# Patient Record
Sex: MALE | Race: WHITE | Employment: OTHER
[De-identification: names, ages, dates, MRNs, and addresses within clinical notes are randomized per-mention and may not be internally consistent; named-entity substitution may affect disease eponyms.]

---

## 2017-01-17 ENCOUNTER — OFFICE VISIT (OUTPATIENT)
Dept: CARDIOLOGY | Facility: CLINIC | Age: 67
End: 2017-01-17

## 2017-01-17 VITALS
BODY MASS INDEX: 23.35 KG/M2 | RESPIRATION RATE: 16 BRPM | WEIGHT: 166.8 LBS | HEIGHT: 71 IN | HEART RATE: 48 BPM | SYSTOLIC BLOOD PRESSURE: 131 MMHG | DIASTOLIC BLOOD PRESSURE: 69 MMHG | OXYGEN SATURATION: 97 %

## 2017-01-17 DIAGNOSIS — Z01.810 PREOP CARDIOVASCULAR EXAM: ICD-10-CM

## 2017-01-17 DIAGNOSIS — I50.42 CHRONIC COMBINED SYSTOLIC AND DIASTOLIC CHF, NYHA CLASS 3 (HCC): Primary | ICD-10-CM

## 2017-01-17 DIAGNOSIS — I42.8 NON-ISCHEMIC CARDIOMYOPATHY (HCC): ICD-10-CM

## 2017-01-17 PROCEDURE — 1123F ACP DISCUSS/DSCN MKR DOCD: CPT | Performed by: FAMILY MEDICINE

## 2017-01-17 PROCEDURE — G8427 DOCREV CUR MEDS BY ELIG CLIN: HCPCS | Performed by: FAMILY MEDICINE

## 2017-01-17 PROCEDURE — 3017F COLORECTAL CA SCREEN DOC REV: CPT | Performed by: FAMILY MEDICINE

## 2017-01-17 PROCEDURE — 93000 ELECTROCARDIOGRAM COMPLETE: CPT | Performed by: FAMILY MEDICINE

## 2017-01-17 PROCEDURE — G8420 CALC BMI NORM PARAMETERS: HCPCS | Performed by: FAMILY MEDICINE

## 2017-01-17 PROCEDURE — G8484 FLU IMMUNIZE NO ADMIN: HCPCS | Performed by: FAMILY MEDICINE

## 2017-01-17 PROCEDURE — 1036F TOBACCO NON-USER: CPT | Performed by: FAMILY MEDICINE

## 2017-01-17 PROCEDURE — 4040F PNEUMOC VAC/ADMIN/RCVD: CPT | Performed by: FAMILY MEDICINE

## 2017-01-17 PROCEDURE — 99213 OFFICE O/P EST LOW 20 MIN: CPT | Performed by: FAMILY MEDICINE

## 2017-01-26 PROCEDURE — 93295 DEV INTERROG REMOTE 1/2/MLT: CPT | Performed by: FAMILY MEDICINE

## 2017-01-31 ENCOUNTER — TELEPHONE (OUTPATIENT)
Dept: CARDIOLOGY | Facility: CLINIC | Age: 67
End: 2017-01-31

## 2017-01-31 DIAGNOSIS — I42.8 NON-ISCHEMIC CARDIOMYOPATHY (HCC): ICD-10-CM

## 2017-01-31 DIAGNOSIS — Z95.810 ICD (IMPLANTABLE CARDIOVERTER-DEFIBRILLATOR) IN PLACE: Primary | ICD-10-CM

## 2017-01-31 PROCEDURE — 93296 REM INTERROG EVL PM/IDS: CPT | Performed by: FAMILY MEDICINE

## 2017-02-08 ENCOUNTER — OFFICE VISIT (OUTPATIENT)
Dept: VASCULAR SURGERY | Facility: CLINIC | Age: 67
End: 2017-02-08

## 2017-02-08 VITALS
TEMPERATURE: 95.9 F | DIASTOLIC BLOOD PRESSURE: 72 MMHG | SYSTOLIC BLOOD PRESSURE: 122 MMHG | HEART RATE: 79 BPM | RESPIRATION RATE: 16 BRPM | BODY MASS INDEX: 23.52 KG/M2 | WEIGHT: 168 LBS | HEIGHT: 71 IN

## 2017-02-08 DIAGNOSIS — Z99.2 ESRD (END STAGE RENAL DISEASE) ON DIALYSIS (HCC): Primary | ICD-10-CM

## 2017-02-08 DIAGNOSIS — N18.6 ESRD (END STAGE RENAL DISEASE) ON DIALYSIS (HCC): Primary | ICD-10-CM

## 2017-02-08 PROCEDURE — 99024 POSTOP FOLLOW-UP VISIT: CPT | Performed by: SURGERY

## 2017-03-15 ENCOUNTER — OFFICE VISIT (OUTPATIENT)
Dept: VASCULAR SURGERY | Age: 67
End: 2017-03-15

## 2017-03-15 VITALS
SYSTOLIC BLOOD PRESSURE: 130 MMHG | RESPIRATION RATE: 20 BRPM | HEART RATE: 94 BPM | HEIGHT: 71 IN | WEIGHT: 169.5 LBS | BODY MASS INDEX: 23.73 KG/M2 | TEMPERATURE: 97 F | DIASTOLIC BLOOD PRESSURE: 83 MMHG

## 2017-03-15 DIAGNOSIS — Z99.2 ESRD (END STAGE RENAL DISEASE) ON DIALYSIS (HCC): Primary | ICD-10-CM

## 2017-03-15 DIAGNOSIS — N18.6 ESRD (END STAGE RENAL DISEASE) ON DIALYSIS (HCC): Primary | ICD-10-CM

## 2017-03-15 PROCEDURE — 99024 POSTOP FOLLOW-UP VISIT: CPT | Performed by: SURGERY

## 2017-04-24 ENCOUNTER — TELEPHONE (OUTPATIENT)
Dept: CARDIOLOGY | Age: 67
End: 2017-04-24

## 2017-04-24 DIAGNOSIS — I42.8 NON-ISCHEMIC CARDIOMYOPATHY (HCC): ICD-10-CM

## 2017-04-24 DIAGNOSIS — Z95.810 ICD (IMPLANTABLE CARDIOVERTER-DEFIBRILLATOR) IN PLACE: Primary | ICD-10-CM

## 2017-05-01 ENCOUNTER — OFFICE VISIT (OUTPATIENT)
Dept: CARDIOLOGY | Age: 67
End: 2017-05-01
Payer: MEDICARE

## 2017-05-01 VITALS
HEIGHT: 71 IN | DIASTOLIC BLOOD PRESSURE: 53 MMHG | RESPIRATION RATE: 18 BRPM | HEART RATE: 71 BPM | SYSTOLIC BLOOD PRESSURE: 94 MMHG | WEIGHT: 171.6 LBS | BODY MASS INDEX: 24.02 KG/M2

## 2017-05-01 DIAGNOSIS — I50.42 CHRONIC COMBINED SYSTOLIC AND DIASTOLIC CHF, NYHA CLASS 3 (HCC): Primary | ICD-10-CM

## 2017-05-01 DIAGNOSIS — I95.1 DYSAUTONOMIA ORTHOSTATIC HYPOTENSION SYNDROME: ICD-10-CM

## 2017-05-01 DIAGNOSIS — I42.8 NON-ISCHEMIC CARDIOMYOPATHY (HCC): ICD-10-CM

## 2017-05-01 PROCEDURE — G8420 CALC BMI NORM PARAMETERS: HCPCS | Performed by: FAMILY MEDICINE

## 2017-05-01 PROCEDURE — 4040F PNEUMOC VAC/ADMIN/RCVD: CPT | Performed by: FAMILY MEDICINE

## 2017-05-01 PROCEDURE — 3017F COLORECTAL CA SCREEN DOC REV: CPT | Performed by: FAMILY MEDICINE

## 2017-05-01 PROCEDURE — G8427 DOCREV CUR MEDS BY ELIG CLIN: HCPCS | Performed by: FAMILY MEDICINE

## 2017-05-01 PROCEDURE — 99213 OFFICE O/P EST LOW 20 MIN: CPT | Performed by: FAMILY MEDICINE

## 2017-05-01 PROCEDURE — 1036F TOBACCO NON-USER: CPT | Performed by: FAMILY MEDICINE

## 2017-05-01 PROCEDURE — 1123F ACP DISCUSS/DSCN MKR DOCD: CPT | Performed by: FAMILY MEDICINE

## 2017-05-01 RX ORDER — SODIUM BICARBONATE 650 MG/1
325 TABLET ORAL 2 TIMES DAILY
Refills: 6 | COMMUNITY
Start: 2017-04-08

## 2017-05-03 ENCOUNTER — HOSPITAL ENCOUNTER (OUTPATIENT)
Dept: INTERVENTIONAL RADIOLOGY/VASCULAR | Age: 67
Discharge: HOME | End: 2017-05-03
Payer: MEDICARE

## 2017-05-03 VITALS
SYSTOLIC BLOOD PRESSURE: 101 MMHG | HEART RATE: 65 BPM | DIASTOLIC BLOOD PRESSURE: 65 MMHG | HEIGHT: 71 IN | OXYGEN SATURATION: 96 % | BODY MASS INDEX: 23.77 KG/M2 | RESPIRATION RATE: 16 BRPM | WEIGHT: 169.75 LBS | TEMPERATURE: 97.4 F

## 2017-05-03 DIAGNOSIS — L98.8 FISTULA: ICD-10-CM

## 2017-05-03 PROCEDURE — 76000 FLUOROSCOPY <1 HR PHYS/QHP: CPT | Performed by: RADIOLOGY

## 2017-05-03 PROCEDURE — 2500000003 HC RX 250 WO HCPCS: Performed by: RADIOLOGY

## 2017-05-03 RX ORDER — LIDOCAINE HYDROCHLORIDE 20 MG/ML
INJECTION, SOLUTION INFILTRATION; PERINEURAL
Status: COMPLETED | OUTPATIENT
Start: 2017-05-03 | End: 2017-05-03

## 2017-05-03 RX ADMIN — LIDOCAINE HYDROCHLORIDE 15 ML: 20 INJECTION, SOLUTION INFILTRATION; PERINEURAL at 09:07

## 2017-05-10 ENCOUNTER — OFFICE VISIT (OUTPATIENT)
Dept: VASCULAR SURGERY | Age: 67
End: 2017-05-10
Payer: MEDICARE

## 2017-05-10 VITALS
DIASTOLIC BLOOD PRESSURE: 62 MMHG | HEIGHT: 71 IN | RESPIRATION RATE: 18 BRPM | HEART RATE: 75 BPM | BODY MASS INDEX: 23.8 KG/M2 | WEIGHT: 170 LBS | TEMPERATURE: 97.1 F | SYSTOLIC BLOOD PRESSURE: 106 MMHG

## 2017-05-10 DIAGNOSIS — N18.6 ESRD (END STAGE RENAL DISEASE) ON DIALYSIS (HCC): Primary | ICD-10-CM

## 2017-05-10 DIAGNOSIS — Z99.2 ESRD (END STAGE RENAL DISEASE) ON DIALYSIS (HCC): Primary | ICD-10-CM

## 2017-05-10 PROCEDURE — 3017F COLORECTAL CA SCREEN DOC REV: CPT | Performed by: SURGERY

## 2017-05-10 PROCEDURE — G8420 CALC BMI NORM PARAMETERS: HCPCS | Performed by: SURGERY

## 2017-05-10 PROCEDURE — G8427 DOCREV CUR MEDS BY ELIG CLIN: HCPCS | Performed by: SURGERY

## 2017-05-10 PROCEDURE — 1123F ACP DISCUSS/DSCN MKR DOCD: CPT | Performed by: SURGERY

## 2017-05-10 PROCEDURE — 4040F PNEUMOC VAC/ADMIN/RCVD: CPT | Performed by: SURGERY

## 2017-05-10 PROCEDURE — 99213 OFFICE O/P EST LOW 20 MIN: CPT | Performed by: SURGERY

## 2017-05-10 PROCEDURE — 1036F TOBACCO NON-USER: CPT | Performed by: SURGERY

## 2017-05-10 ASSESSMENT — ENCOUNTER SYMPTOMS
TROUBLE SWALLOWING: 0
BACK PAIN: 0
ABDOMINAL PAIN: 0
COLOR CHANGE: 0
FACIAL SWELLING: 0
SHORTNESS OF BREATH: 0
CHEST TIGHTNESS: 0

## 2017-06-14 ENCOUNTER — OFFICE VISIT (OUTPATIENT)
Dept: VASCULAR SURGERY | Age: 67
End: 2017-06-14
Payer: MEDICARE

## 2017-06-14 VITALS
BODY MASS INDEX: 23.51 KG/M2 | HEIGHT: 71 IN | SYSTOLIC BLOOD PRESSURE: 127 MMHG | HEART RATE: 79 BPM | WEIGHT: 167.9 LBS | TEMPERATURE: 98.4 F | RESPIRATION RATE: 20 BRPM | DIASTOLIC BLOOD PRESSURE: 63 MMHG

## 2017-06-14 DIAGNOSIS — Z99.2 ESRD (END STAGE RENAL DISEASE) ON DIALYSIS (HCC): Primary | ICD-10-CM

## 2017-06-14 DIAGNOSIS — N18.6 ESRD (END STAGE RENAL DISEASE) ON DIALYSIS (HCC): Primary | ICD-10-CM

## 2017-06-14 PROCEDURE — 4040F PNEUMOC VAC/ADMIN/RCVD: CPT | Performed by: SURGERY

## 2017-06-14 PROCEDURE — 99212 OFFICE O/P EST SF 10 MIN: CPT | Performed by: SURGERY

## 2017-06-14 PROCEDURE — G8420 CALC BMI NORM PARAMETERS: HCPCS | Performed by: SURGERY

## 2017-06-14 PROCEDURE — G8427 DOCREV CUR MEDS BY ELIG CLIN: HCPCS | Performed by: SURGERY

## 2017-06-14 PROCEDURE — 3017F COLORECTAL CA SCREEN DOC REV: CPT | Performed by: SURGERY

## 2017-06-14 PROCEDURE — 1036F TOBACCO NON-USER: CPT | Performed by: SURGERY

## 2017-06-14 PROCEDURE — 1123F ACP DISCUSS/DSCN MKR DOCD: CPT | Performed by: SURGERY

## 2017-06-19 ENCOUNTER — HOSPITAL ENCOUNTER (OUTPATIENT)
Age: 67
Setting detail: OUTPATIENT SURGERY
Discharge: HOME | End: 2017-06-19
Attending: SPECIALIST | Admitting: SPECIALIST
Payer: MEDICARE

## 2017-06-19 VITALS
SYSTOLIC BLOOD PRESSURE: 125 MMHG | WEIGHT: 170 LBS | DIASTOLIC BLOOD PRESSURE: 79 MMHG | TEMPERATURE: 96.8 F | OXYGEN SATURATION: 98 % | HEART RATE: 73 BPM | RESPIRATION RATE: 18 BRPM | HEIGHT: 72 IN | BODY MASS INDEX: 23.03 KG/M2

## 2017-06-19 PROCEDURE — 88305 TISSUE EXAM BY PATHOLOGIST: CPT

## 2017-06-19 PROCEDURE — 3600000012 HC SURGERY LEVEL 2 ADDTL 15MIN: Performed by: SPECIALIST

## 2017-06-19 PROCEDURE — 2500000003 HC RX 250 WO HCPCS: Performed by: SPECIALIST

## 2017-06-19 PROCEDURE — 88332 PATH CONSLTJ SURG EA ADD BLK: CPT

## 2017-06-19 PROCEDURE — 3600000002 HC SURGERY LEVEL 2 BASE: Performed by: SPECIALIST

## 2017-06-19 PROCEDURE — 88331 PATH CONSLTJ SURG 1 BLK 1SPC: CPT

## 2017-06-19 RX ORDER — SODIUM CHLORIDE, SODIUM LACTATE, POTASSIUM CHLORIDE, CALCIUM CHLORIDE 600; 310; 30; 20 MG/100ML; MG/100ML; MG/100ML; MG/100ML
INJECTION, SOLUTION INTRAVENOUS CONTINUOUS
Status: DISCONTINUED | OUTPATIENT
Start: 2017-06-19 | End: 2017-06-19 | Stop reason: HOSPADM

## 2017-06-19 RX ORDER — LIDOCAINE HYDROCHLORIDE AND EPINEPHRINE 10; 10 MG/ML; UG/ML
INJECTION, SOLUTION INFILTRATION; PERINEURAL PRN
Status: DISCONTINUED | OUTPATIENT
Start: 2017-06-19 | End: 2017-06-19 | Stop reason: HOSPADM

## 2017-06-19 ASSESSMENT — PAIN - FUNCTIONAL ASSESSMENT: PAIN_FUNCTIONAL_ASSESSMENT: 0-10

## 2017-06-20 LAB — SURGICAL PATHOLOGY REPORT: NORMAL

## 2017-06-22 ENCOUNTER — CARE COORDINATION (OUTPATIENT)
Dept: CASE MANAGEMENT | Age: 67
End: 2017-06-22

## 2017-07-20 PROCEDURE — 93295 DEV INTERROG REMOTE 1/2/MLT: CPT | Performed by: FAMILY MEDICINE

## 2017-07-24 ENCOUNTER — TELEPHONE (OUTPATIENT)
Dept: CARDIOLOGY | Age: 67
End: 2017-07-24

## 2017-07-24 DIAGNOSIS — I42.8 NON-ISCHEMIC CARDIOMYOPATHY (HCC): Primary | ICD-10-CM

## 2017-07-24 DIAGNOSIS — Z95.810 ICD (IMPLANTABLE CARDIOVERTER-DEFIBRILLATOR) IN PLACE: ICD-10-CM

## 2017-07-24 PROCEDURE — 93296 REM INTERROG EVL PM/IDS: CPT | Performed by: FAMILY MEDICINE

## 2017-07-25 ENCOUNTER — TELEPHONE (OUTPATIENT)
Dept: CARDIOLOGY | Age: 67
End: 2017-07-25

## 2017-10-25 ENCOUNTER — OFFICE VISIT (OUTPATIENT)
Dept: CARDIOLOGY | Age: 67
End: 2017-10-25
Payer: MEDICARE

## 2017-10-25 VITALS
HEART RATE: 62 BPM | SYSTOLIC BLOOD PRESSURE: 122 MMHG | DIASTOLIC BLOOD PRESSURE: 77 MMHG | RESPIRATION RATE: 22 BRPM | BODY MASS INDEX: 23.8 KG/M2 | WEIGHT: 170 LBS | HEIGHT: 71 IN | OXYGEN SATURATION: 98 %

## 2017-10-25 DIAGNOSIS — Z95.810 ICD (IMPLANTABLE CARDIOVERTER-DEFIBRILLATOR) IN PLACE: Primary | ICD-10-CM

## 2017-10-25 DIAGNOSIS — I42.8 NON-ISCHEMIC CARDIOMYOPATHY (HCC): ICD-10-CM

## 2017-10-25 DIAGNOSIS — I42.8 NON-ISCHEMIC CARDIOMYOPATHY (HCC): Primary | ICD-10-CM

## 2017-10-25 DIAGNOSIS — I50.42 CHRONIC COMBINED SYSTOLIC AND DIASTOLIC CHF, NYHA CLASS 3 (HCC): ICD-10-CM

## 2017-10-25 DIAGNOSIS — I49.5 CHRONOTROPIC INCOMPETENCE WITH SINUS NODE DYSFUNCTION (HCC): ICD-10-CM

## 2017-10-25 DIAGNOSIS — I95.1 DYSAUTONOMIA ORTHOSTATIC HYPOTENSION SYNDROME: ICD-10-CM

## 2017-10-25 DIAGNOSIS — Z45.02 IMPLANTABLE DEFIBRILLATOR REPROGRAMMING/CHECK: ICD-10-CM

## 2017-10-25 PROCEDURE — G8427 DOCREV CUR MEDS BY ELIG CLIN: HCPCS | Performed by: FAMILY MEDICINE

## 2017-10-25 PROCEDURE — 93289 INTERROG DEVICE EVAL HEART: CPT | Performed by: FAMILY MEDICINE

## 2017-10-25 PROCEDURE — 1036F TOBACCO NON-USER: CPT | Performed by: FAMILY MEDICINE

## 2017-10-25 PROCEDURE — 99214 OFFICE O/P EST MOD 30 MIN: CPT | Performed by: FAMILY MEDICINE

## 2017-10-25 PROCEDURE — 1123F ACP DISCUSS/DSCN MKR DOCD: CPT | Performed by: FAMILY MEDICINE

## 2017-10-25 PROCEDURE — G8420 CALC BMI NORM PARAMETERS: HCPCS | Performed by: FAMILY MEDICINE

## 2017-10-25 PROCEDURE — 4040F PNEUMOC VAC/ADMIN/RCVD: CPT | Performed by: FAMILY MEDICINE

## 2017-10-25 PROCEDURE — 3017F COLORECTAL CA SCREEN DOC REV: CPT | Performed by: FAMILY MEDICINE

## 2017-10-25 PROCEDURE — 93000 ELECTROCARDIOGRAM COMPLETE: CPT | Performed by: FAMILY MEDICINE

## 2017-10-25 PROCEDURE — G8484 FLU IMMUNIZE NO ADMIN: HCPCS | Performed by: FAMILY MEDICINE

## 2017-10-25 RX ORDER — LISINOPRIL 5 MG/1
2.5 TABLET ORAL DAILY
Qty: 45 TABLET | Refills: 3 | Status: SHIPPED | OUTPATIENT
Start: 2017-10-25 | End: 2018-09-12 | Stop reason: SDUPTHER

## 2017-10-25 NOTE — PROGRESS NOTES
Patient: Agustina Garcia  : 1950  Date of Visit: 2017    REASON FOR VISIT / CONSULTATION: 1 Year Follow Up (Pacer check today. EKG done today. HX: Non-ischemic cardiomyopathy, CHF. Pt wt at last visit 171 todays wt 170. Pt states he is doing ok. Denies: CP, palpitations, lightheaded/dizziness, SOB.)      Dear Robi Davidson MD,        I had the pleasure of seeing your patient Agustina Garcia in follow up today. As you know, Mr. Toya Castro is a 74 y.o. male with a history of a non ischemic dilated cardiomyopathy leading to placement of a Biotronic ICD with a single lead with atrial sensing ability. Unfortunately, he was found to have chronotropic incompetence since that time with moderate and persistent increased shortness of breath with any exertion leading to an upgrade of his ICD to a dual-chamber ICD with placement of a new atrial lead. He also has some dysautonomia which has been fairly well controlled recently. In  he had an AV fistula placed for dialysis and is currently being treated with dialysis. Since the last time I saw him he reports doing fairly well. He denied any current or recent chest pain, shortness of breath, abdominal pain, bleeding problems, problems with his medications or any other concerns at this time. He says he thinks he might be able to walk a mile without stopping and says he uses an exercise bike at home without any significant chest pain or shortness of breath.      Past Medical History:   Diagnosis Date    CAD (coronary artery disease)     Cancer ([facility]     left ear    Chronic combined systolic and diastolic CHF, NYHA class 3 ([facility])     EF 25-30%    CRF (chronic renal failure) / stage 4 2013    Dysautonomia orthostatic hypotension syndrome ([facility] 2016    Hemodialysis patient ([facility]     Tuesday, Thursday, Saturday -- Robert    History of blood transfusion     History of echocardiogram 13    LMCA: mild irreg 10-20%, LAD: mild irreg 10-20%, LCx: mild irreg 10-20%, RCA: mild irreg 10-20% The conus branch of the RCA had separate ostis off of the aorta, prly of little if any clinical significance.  ICD (implantable cardiac defibrillator), single, in situ 12/16/13    Biotronic    Mixed hyperlipidemia     Non-ischemic cardiomyopathy ([facility] 6/10/2013    EF 25-30%     Restless leg syndrome     Systolic CHF, acute on chronic ([facility] 4/23/2013       CURRENT ALLERGIES: Ancef [cefazolin] and Marcaine [bupivacaine hcl] REVIEW OF SYSTEMS: 10 systems were reviewed. Pertinent positives and negatives as above, all else negative.      Past Surgical History:   Procedure Laterality Date    CARDIAC CATHETERIZATION  6/6/2013    Dr. Leopoldo Diaz  01/18/2017    Left Upper Arm- Dr. Dianna Paniagua, COLON, DIAGNOSTIC      EXCISION / BIOPSY SKIN LESION OF HEAD / NECK Left 6/19/2017    NECK LESION BIOPSY EXCISION, BCC, FROZEN SECTION performed by Connie Arnett MD at [address] [place] [address] Right HIP    OTHER SURGICAL HISTORY  11/2016    Right chest port for dialysis    PACEMAKER PLACEMENT  2013    AICD    PRE-MALIGNANT / BENIGN SKIN LESION EXCISION Left 06/19/2017    Neck area    SKIN BIOPSY  9-    left axila    SKIN CANCER EXCISION Left 06/05/2015    Ear    TYMPANOPLASTY Left 05/12/2016    per Dr. Mariusz Washington History:  Social History   Substance Use Topics    Smoking status: Never Smoker    Smokeless tobacco: Never Used    Alcohol use 0.0 oz/week     3 - 4 Cans of beer per week      Comment: daily drinker BEER 3-4        CURRENT MEDICATIONS:  Outpatient Prescriptions Marked as Taking for the 10/25/17 encounter (Office Visit) with Jeremi Isaac MD   Medication Sig Dispense Refill    lisinopril (PRINIVIL;ZESTRIL) 5 MG tablet Take 0.5 tablets by mouth daily 45 tablet 3    gabapentin (NEURONTIN) 100 MG capsule TAKE 1 CAPSULE BY MOUTH 2 TIMES DAILY AS NEEDED (FOOT NUMBNESS) 60 capsule 0    rOPINIRole (REQUIP) 0.5 MG tablet TAKE 1 TABLET BY MOUTH AT BEDTIME 90 tablet 1    sodium bicarbonate 650 MG tablet TAKE 1 TABLET BY MOUTH EVERY DAY  6    carbidopa-levodopa (SINEMET)  MG per tablet TAKE 1 TABLET BY MOUTH IN THE MORNING AND TAKE 2 TABLETS IN THE EVENING 90 tablet 6    metoprolol tartrate (LOPRESSOR) 25 MG tablet Take 0.5 tablets by mouth 2 times daily (Patient taking differently: Take 25 mg by mouth daily ) 90 tablet 3    allopurinol (ZYLOPRIM) 100 MG tablet Take 1 tablet by mouth daily 30 tablet 5    midodrine (PROAMATINE) 5 MG tablet Take 1 tablet by mouth 2 times daily (with meals) (Patient taking differently: Take 5 mg by mouth daily 1 tab prior to dialysis  (Tuesday Thursday and Saturday)) 90 tablet 3       FAMILY HISTORY: family history includes Heart Disease in his father. PHYSICAL EXAM:   /77 (Site: Right Arm, Position: Sitting, Cuff Size: Medium Adult)   Pulse 62   Resp 22   Ht 5' 11\" (1.803 m)   Wt 170 lb (77.1 kg)   SpO2 98%   BMI 23.71 kg/m²  Body mass index is 23.71 kg/m². Constitutional: He is oriented to person, place, and time. He appears well-developed and well-nourished. In no acute distress. HEENT: Normocephalic and atraumatic. No JVD present. Carotid bruit is not present. No mass and no thyromegaly present. No lymphadenopathy present. Cardiovascular: Normal rate, regular rhythm, normal heart sounds and intact distal pulses. Exam reveals no gallop and no friction rub. I/VI systolic murmur heard maximally at the [place] and a soft holodiastolic murmur most likely due to his AV fistula. Pulmonary/Chest: Effort normal and breath sounds normal. No respiratory distress. He has no wheezes, rhonchi or rales. Abdominal: Soft, non-tender. Bowel sounds and aorta are normal. He exhibits no organomegaly, mass or bruit. Extremities: Trace lower extremity edema, no cyanosis, or clubbing. Pulses are 2+ radial/carotid.  1+ dorsalis pedis and every 6 months. Repeat ICD check in 3 months at home. FOLLOW UP:   I told Mr. Nanda Ardon to call my office if he had any problems, but otherwise told him to Return in about 6 months (around 4/25/2018). However, I would be happy to see him sooner should the need arise. Once again, thank you for allowing me to participate in this patients care. Please do not hesitate to contact me could I be of further assistance. Sincerely,  Parag Lucero MD, MS, F.A.C.C. [facility] Cardiology Specialist  [address] [facility], [address] [place] [address]  Phone: [phone], Fax: [phone]    I believe that the risk of significant morbidity and mortality related to the patient's current medical conditions are: intermediate. 25 minutes were spent with the patient and all of his questions were answered.

## 2017-11-21 ENCOUNTER — ANESTHESIA EVENT (OUTPATIENT)
Dept: OPERATING ROOM | Age: 67
End: 2017-11-21
Payer: MEDICARE

## 2017-11-22 ENCOUNTER — ANESTHESIA (OUTPATIENT)
Dept: OPERATING ROOM | Age: 67
End: 2017-11-22
Payer: MEDICARE

## 2017-11-22 ENCOUNTER — HOSPITAL ENCOUNTER (OUTPATIENT)
Age: 67
Setting detail: OUTPATIENT SURGERY
Discharge: HOME | End: 2017-11-22
Attending: SURGERY | Admitting: SURGERY
Payer: MEDICARE

## 2017-11-22 VITALS
TEMPERATURE: 97.7 F | WEIGHT: 170 LBS | HEART RATE: 70 BPM | HEIGHT: 71 IN | SYSTOLIC BLOOD PRESSURE: 110 MMHG | DIASTOLIC BLOOD PRESSURE: 69 MMHG | BODY MASS INDEX: 23.8 KG/M2 | RESPIRATION RATE: 16 BRPM | OXYGEN SATURATION: 99 %

## 2017-11-22 VITALS — SYSTOLIC BLOOD PRESSURE: 108 MMHG | TEMPERATURE: 94.7 F | OXYGEN SATURATION: 95 % | DIASTOLIC BLOOD PRESSURE: 48 MMHG

## 2017-11-22 LAB — POTASSIUM SERPL-SCNC: 5 MMOL/L (ref 3.7–5.3)

## 2017-11-22 PROCEDURE — 3600000013 HC SURGERY LEVEL 3 ADDTL 15MIN: Performed by: SURGERY

## 2017-11-22 PROCEDURE — 84132 ASSAY OF SERUM POTASSIUM: CPT

## 2017-11-22 PROCEDURE — 3700000001 HC ADD 15 MINUTES (ANESTHESIA): Performed by: SURGERY

## 2017-11-22 PROCEDURE — 7100000011 HC PHASE II RECOVERY - ADDTL 15 MIN: Performed by: SURGERY

## 2017-11-22 PROCEDURE — 2500000003 HC RX 250 WO HCPCS: Performed by: NURSE ANESTHETIST, CERTIFIED REGISTERED

## 2017-11-22 PROCEDURE — 2500000003 HC RX 250 WO HCPCS: Performed by: SURGERY

## 2017-11-22 PROCEDURE — 7100000010 HC PHASE II RECOVERY - FIRST 15 MIN: Performed by: SURGERY

## 2017-11-22 PROCEDURE — 6360000002 HC RX W HCPCS: Performed by: NURSE ANESTHETIST, CERTIFIED REGISTERED

## 2017-11-22 PROCEDURE — 3700000000 HC ANESTHESIA ATTENDED CARE: Performed by: SURGERY

## 2017-11-22 PROCEDURE — 36415 COLL VENOUS BLD VENIPUNCTURE: CPT

## 2017-11-22 PROCEDURE — 2580000003 HC RX 258: Performed by: SURGERY

## 2017-11-22 PROCEDURE — 3600000003 HC SURGERY LEVEL 3 BASE: Performed by: SURGERY

## 2017-11-22 PROCEDURE — C1768 GRAFT, VASCULAR: HCPCS | Performed by: SURGERY

## 2017-11-22 PROCEDURE — 6360000002 HC RX W HCPCS: Performed by: SURGERY

## 2017-11-22 DEVICE — GRAFT VASC L45CM DIA4-7MM PTFE CBAS HEP SURF STD WALLED: Type: IMPLANTABLE DEVICE | Site: ARM | Status: FUNCTIONAL

## 2017-11-22 RX ORDER — PROPOFOL 10 MG/ML
INJECTION, EMULSION INTRAVENOUS PRN
Status: DISCONTINUED | OUTPATIENT
Start: 2017-11-22 | End: 2017-11-22 | Stop reason: SDUPTHER

## 2017-11-22 RX ORDER — HYDROCODONE BITARTRATE AND ACETAMINOPHEN 5; 325 MG/1; MG/1
2 TABLET ORAL EVERY 6 HOURS PRN
Qty: 20 TABLET | Refills: 0 | Status: SHIPPED | OUTPATIENT
Start: 2017-11-22 | End: 2018-08-07

## 2017-11-22 RX ORDER — LIDOCAINE HYDROCHLORIDE 10 MG/ML
INJECTION, SOLUTION EPIDURAL; INFILTRATION; INTRACAUDAL; PERINEURAL PRN
Status: DISCONTINUED | OUTPATIENT
Start: 2017-11-22 | End: 2017-11-22 | Stop reason: HOSPADM

## 2017-11-22 RX ORDER — ONDANSETRON 2 MG/ML
4 INJECTION INTRAMUSCULAR; INTRAVENOUS EVERY 8 HOURS PRN
Status: DISCONTINUED | OUTPATIENT
Start: 2017-11-22 | End: 2017-11-22 | Stop reason: HOSPADM

## 2017-11-22 RX ORDER — LABETALOL HYDROCHLORIDE 5 MG/ML
5 INJECTION, SOLUTION INTRAVENOUS EVERY 10 MIN PRN
Status: DISCONTINUED | OUTPATIENT
Start: 2017-11-22 | End: 2017-11-22 | Stop reason: HOSPADM

## 2017-11-22 RX ORDER — SODIUM CHLORIDE, SODIUM LACTATE, POTASSIUM CHLORIDE, CALCIUM CHLORIDE 600; 310; 30; 20 MG/100ML; MG/100ML; MG/100ML; MG/100ML
INJECTION, SOLUTION INTRAVENOUS CONTINUOUS
Status: DISCONTINUED | OUTPATIENT
Start: 2017-11-22 | End: 2017-11-22 | Stop reason: HOSPADM

## 2017-11-22 RX ORDER — CLINDAMYCIN PHOSPHATE 900 MG/50ML
900 INJECTION INTRAVENOUS ONCE
Status: COMPLETED | OUTPATIENT
Start: 2017-11-22 | End: 2017-11-22

## 2017-11-22 RX ORDER — FENTANYL CITRATE 50 UG/ML
50 INJECTION, SOLUTION INTRAMUSCULAR; INTRAVENOUS EVERY 5 MIN PRN
Status: DISCONTINUED | OUTPATIENT
Start: 2017-11-22 | End: 2017-11-22 | Stop reason: HOSPADM

## 2017-11-22 RX ORDER — LIDOCAINE HYDROCHLORIDE 20 MG/ML
INJECTION, SOLUTION INFILTRATION; PERINEURAL PRN
Status: DISCONTINUED | OUTPATIENT
Start: 2017-11-22 | End: 2017-11-22 | Stop reason: SDUPTHER

## 2017-11-22 RX ORDER — HYDROCODONE BITARTRATE AND ACETAMINOPHEN 5; 325 MG/1; MG/1
1 TABLET ORAL PRN
Status: DISCONTINUED | OUTPATIENT
Start: 2017-11-22 | End: 2017-11-22 | Stop reason: HOSPADM

## 2017-11-22 RX ORDER — ACETAMINOPHEN 325 MG/1
650 TABLET ORAL EVERY 4 HOURS PRN
Status: DISCONTINUED | OUTPATIENT
Start: 2017-11-22 | End: 2017-11-22 | Stop reason: HOSPADM

## 2017-11-22 RX ORDER — MIDAZOLAM HYDROCHLORIDE 1 MG/ML
INJECTION INTRAMUSCULAR; INTRAVENOUS PRN
Status: DISCONTINUED | OUTPATIENT
Start: 2017-11-22 | End: 2017-11-22 | Stop reason: SDUPTHER

## 2017-11-22 RX ORDER — FENTANYL CITRATE 50 UG/ML
INJECTION, SOLUTION INTRAMUSCULAR; INTRAVENOUS PRN
Status: DISCONTINUED | OUTPATIENT
Start: 2017-11-22 | End: 2017-11-22 | Stop reason: SDUPTHER

## 2017-11-22 RX ORDER — HYDRALAZINE HYDROCHLORIDE 20 MG/ML
5 INJECTION INTRAMUSCULAR; INTRAVENOUS EVERY 10 MIN PRN
Status: DISCONTINUED | OUTPATIENT
Start: 2017-11-22 | End: 2017-11-22 | Stop reason: HOSPADM

## 2017-11-22 RX ORDER — HYDROCODONE BITARTRATE AND ACETAMINOPHEN 5; 325 MG/1; MG/1
2 TABLET ORAL PRN
Status: DISCONTINUED | OUTPATIENT
Start: 2017-11-22 | End: 2017-11-22 | Stop reason: HOSPADM

## 2017-11-22 RX ORDER — SODIUM CHLORIDE 9 MG/ML
INJECTION, SOLUTION INTRAVENOUS CONTINUOUS
Status: DISCONTINUED | OUTPATIENT
Start: 2017-11-22 | End: 2017-11-22 | Stop reason: HOSPADM

## 2017-11-22 RX ORDER — FENTANYL CITRATE 50 UG/ML
25 INJECTION, SOLUTION INTRAMUSCULAR; INTRAVENOUS EVERY 5 MIN PRN
Status: DISCONTINUED | OUTPATIENT
Start: 2017-11-22 | End: 2017-11-22 | Stop reason: HOSPADM

## 2017-11-22 RX ORDER — ONDANSETRON 2 MG/ML
4 INJECTION INTRAMUSCULAR; INTRAVENOUS
Status: COMPLETED | OUTPATIENT
Start: 2017-11-22 | End: 2017-11-22

## 2017-11-22 RX ADMIN — FENTANYL CITRATE 25 MCG: 50 INJECTION, SOLUTION INTRAMUSCULAR; INTRAVENOUS at 13:40

## 2017-11-22 RX ADMIN — LIDOCAINE HYDROCHLORIDE 25 MG: 20 INJECTION, SOLUTION INFILTRATION; PERINEURAL at 13:56

## 2017-11-22 RX ADMIN — PROPOFOL 10 MG: 10 INJECTION, EMULSION INTRAVENOUS at 14:24

## 2017-11-22 RX ADMIN — ONDANSETRON 4 MG: 2 INJECTION INTRAMUSCULAR; INTRAVENOUS at 14:35

## 2017-11-22 RX ADMIN — PROPOFOL 10 MG: 10 INJECTION, EMULSION INTRAVENOUS at 14:35

## 2017-11-22 RX ADMIN — FENTANYL CITRATE 50 MCG: 50 INJECTION, SOLUTION INTRAMUSCULAR; INTRAVENOUS at 13:32

## 2017-11-22 RX ADMIN — PROPOFOL 10 MG: 10 INJECTION, EMULSION INTRAVENOUS at 14:44

## 2017-11-22 RX ADMIN — PROPOFOL 15 MG: 10 INJECTION, EMULSION INTRAVENOUS at 14:40

## 2017-11-22 RX ADMIN — MIDAZOLAM HYDROCHLORIDE 1 MG: 2 INJECTION, SOLUTION INTRAMUSCULAR; INTRAVENOUS at 13:33

## 2017-11-22 RX ADMIN — FENTANYL CITRATE 25 MCG: 50 INJECTION, SOLUTION INTRAMUSCULAR; INTRAVENOUS at 13:48

## 2017-11-22 RX ADMIN — PROPOFOL 10 MG: 10 INJECTION, EMULSION INTRAVENOUS at 14:31

## 2017-11-22 RX ADMIN — PROPOFOL 10 MG: 10 INJECTION, EMULSION INTRAVENOUS at 14:03

## 2017-11-22 RX ADMIN — PROPOFOL 10 MG: 10 INJECTION, EMULSION INTRAVENOUS at 14:16

## 2017-11-22 RX ADMIN — PROPOFOL 15 MG: 10 INJECTION, EMULSION INTRAVENOUS at 14:47

## 2017-11-22 RX ADMIN — PROPOFOL 10 MG: 10 INJECTION, EMULSION INTRAVENOUS at 14:09

## 2017-11-22 RX ADMIN — SODIUM CHLORIDE: 9 INJECTION, SOLUTION INTRAVENOUS at 11:51

## 2017-11-22 RX ADMIN — PROPOFOL 15 MG: 10 INJECTION, EMULSION INTRAVENOUS at 14:14

## 2017-11-22 RX ADMIN — PROPOFOL 15 MG: 10 INJECTION, EMULSION INTRAVENOUS at 13:51

## 2017-11-22 RX ADMIN — MIDAZOLAM HYDROCHLORIDE 1 MG: 2 INJECTION, SOLUTION INTRAMUSCULAR; INTRAVENOUS at 13:37

## 2017-11-22 RX ADMIN — PROPOFOL 10 MG: 10 INJECTION, EMULSION INTRAVENOUS at 14:50

## 2017-11-22 RX ADMIN — CLINDAMYCIN IN 5 PERCENT DEXTROSE 900 MG: 18 INJECTION, SOLUTION INTRAVENOUS at 13:19

## 2017-11-22 RX ADMIN — PROPOFOL 10 MG: 10 INJECTION, EMULSION INTRAVENOUS at 14:22

## 2017-11-22 RX ADMIN — PROPOFOL 15 MG: 10 INJECTION, EMULSION INTRAVENOUS at 14:28

## 2017-11-22 RX ADMIN — PROPOFOL 15 MG: 10 INJECTION, EMULSION INTRAVENOUS at 13:56

## 2017-11-22 RX ADMIN — PROPOFOL 10 MG: 10 INJECTION, EMULSION INTRAVENOUS at 13:46

## 2017-11-22 RX ADMIN — PROPOFOL 10 MG: 10 INJECTION, EMULSION INTRAVENOUS at 13:43

## 2017-11-22 ASSESSMENT — LIFESTYLE VARIABLES: SMOKING_STATUS: 0

## 2017-11-22 ASSESSMENT — PAIN SCALES - GENERAL: PAINLEVEL_OUTOF10: 0

## 2017-11-22 ASSESSMENT — PAIN - FUNCTIONAL ASSESSMENT: PAIN_FUNCTIONAL_ASSESSMENT: 0-10

## 2017-11-22 NOTE — PROGRESS NOTES
Attempted PAT phone call; no answer; unable to leave a message. No voicemail.
Pt returned PAT phone call. Patient instructed on the pre-operative, intra-operative, and post-operative process. Patient's surgery arrival time to the hospital and surgery start time confirmed for the day of surgery. Patient instructed on NPO status. Medication instructions and pre operative instruction sheet reviewed and faxed to [facility].
Unable to contact pt for PAT phone call. Pt's phone does not have voicemail set up. [facility] surgery scheduled on 11-22-17.
transportation arrangements that do not require patient to operate motor Vehicle.      Yes

## 2017-11-22 NOTE — ANESTHESIA POSTPROCEDURE EVALUATION
Department of Anesthesiology  Postprocedure Note    Patient: Pratima Mendez  MRN: [id number]  YOB: 1950  Date of evaluation: 11/22/2017  Time:  3:39 PM     Procedure Summary     Date:  11/22/17 Room / Location:  [address] [place] [address] OR  / Sarah Childs OR    Anesthesia Start:  0021 Anesthesia Stop:  3323    Procedure:  AV FISTULA CREATION REVISION-UPPER EXTREMITY (Right ) Diagnosis:  (ESRD)    Surgeon:  Jorge Lunsford DO Responsible Provider:  Julianne Douglass CRNA    Anesthesia Type:  MAC ASA Status:  4          Anesthesia Type: MAC    Courtney Phase I: Courtney Score: 10    Courtney Phase II: Courtney Score: 10    Last vitals: Reviewed and per EMR flowsheets.        Anesthesia Post Evaluation    Patient location during evaluation: PACU  Patient participation: complete - patient participated  Level of consciousness: awake and alert  Airway patency: patent  Nausea & Vomiting: no nausea and no vomiting  Complications: no  Cardiovascular status: blood pressure returned to baseline and hemodynamically stable  Respiratory status: acceptable and room air  Hydration status: euvolemic  Comments: Denies any pain

## 2017-11-22 NOTE — ANESTHESIA PRE PROCEDURE
 0.9 % sodium chloride infusion   Intravenous Continuous Clydell Bunting,  mL/hr at 11/22/17 1151         Allergies: Allergies   Allergen Reactions    Ancef [Cefazolin] Anaphylaxis    Marcaine [Bupivacaine Hcl] Anaphylaxis       Problem List:    Patient Active Problem List   Diagnosis Code    Anemia, iron deficiency D50.9    Non-ischemic cardiomyopathy ([facility] I42.8    Chronic combined systolic and diastolic CHF, NYHA class 3 (HCC) I50.42    Implantable defibrillator reprogramming/check Z45.02    Chronotropic incompetence with sinus node dysfunction ([facility]) I49.5    Frequent PVCs I49.3    Hypotension due to drugs I95.2    Mixed hyperlipidemia E78.2    Syncope R55    Dysautonomia orthostatic hypotension syndrome ([facility]) G90.3    Chronotropic incompetence with autonomic dysfunction G90.9    LORRAINE (acute kidney injury) ([facility] N17.9    Hearing loss of left ear H91.92    Dehydration, mild E86.0    Preop cardiovascular exam Z01.810    ESRD (end stage renal disease) on dialysis ([facility]) N18.6, Z99.2       Past Medical History:        Diagnosis Date    CAD (coronary artery disease)     Cancer ([facility] 2013    left ear    Chronic combined systolic and diastolic CHF, NYHA class 3 ([facility])     EF 25-30%    CRF (chronic renal failure) / stage 4 4/23/2013    Dysautonomia orthostatic hypotension syndrome ([facility] 5/12/2016    Hemodialysis patient ([facility]     Tuesday, Thursday, Saturday -- Robert    History of blood transfusion 2013    History of echocardiogram 6/6/13    LMCA: mild irreg 10-20%, LAD: mild irreg 10-20%, LCx: mild irreg 10-20%, RCA: mild irreg 10-20% The conus branch of the RCA had separate ostis off of the aorta, prly of little if any clinical significance.     ICD (implantable cardiac defibrillator), single, in situ 12/16/13    Biotronic    Mixed hyperlipidemia     Non-ischemic cardiomyopathy ([facility] 6/10/2013    EF 25-30%     Restless leg syndrome     Systolic CHF, acute on chronic ([facility]) 4/23/2013       Past Surgical History:        Procedure Laterality Date    CARDIAC CATHETERIZATION  6/6/2013    Dr. Patrick Reeves  01/18/2017    Left Upper Arm- Dr. Ember Parks, COLON, DIAGNOSTIC      EXCISION / BIOPSY SKIN LESION OF HEAD / NECK Left 6/19/2017    NECK LESION BIOPSY EXCISION, BCC, FROZEN SECTION performed by Loree Manuel MD at [address] [place] [address] Right HIP    OTHER SURGICAL HISTORY  11/2016    Right chest port for dialysis    PACEMAKER PLACEMENT  2013    AICD    PRE-MALIGNANT / BENIGN SKIN LESION EXCISION Left 06/19/2017    Neck area    SKIN BIOPSY  9-    left axila    SKIN CANCER EXCISION Left 06/05/2015    Ear    TYMPANOPLASTY Left 05/12/2016    per Dr. Felicia Cuellar History:    Social History   Substance Use Topics    Smoking status: Never Smoker    Smokeless tobacco: Never Used    Alcohol use 0.0 oz/week     3 - 4 Cans of beer per week      Comment: daily drinker BEER 3-4                                Counseling given: Not Answered      Vital Signs (Current):   Vitals:    11/20/17 1510 11/22/17 1203   BP:  129/68   Pulse:  69   Resp:  16   Temp:  36.7 °C (98 °F)   TempSrc:  Temporal   SpO2:  96%   Weight: 170 lb (77.1 kg) 170 lb (77.1 kg)   Height: 5' 11\" (1.803 m) 5' 11\" (1.803 m)                                              BP Readings from Last 3 Encounters:   11/22/17 129/68   10/25/17 122/77   10/06/17 106/74       NPO Status: Time of last liquid consumption: 0800 (small sip with meds)                        Time of last solid consumption: 1800                        Date of last liquid consumption: 11/22/17                        Date of last solid food consumption: 11/21/17    BMI:   Wt Readings from Last 3 Encounters:   11/22/17 170 lb (77.1 kg)   10/25/17 170 lb (77.1 kg)   10/06/17 168 lb (76.2 kg)     Body mass index is 23.71 kg/m².     CBC:   Lab Results   Component Value Date WBC 10.7 11/17/2016    RBC 3.88 11/17/2016    HGB 12.3 11/17/2016    HCT 38.1 11/17/2016    MCV 98.4 11/17/2016    RDW 14.4 11/17/2016     11/17/2016       CMP:   Lab Results   Component Value Date     11/17/2016    K 5.0 11/22/2017     11/17/2016    CO2 22 11/17/2016    BUN 51 11/17/2016    CREATININE 3.80 11/17/2016    GFRAA 19 11/17/2016    LABGLOM 16 11/17/2016    GLUCOSE 102 11/17/2016    PROT 7.6 11/17/2016    CALCIUM 9.2 11/17/2016    BILITOT 0.74 11/17/2016    ALKPHOS 79 11/17/2016    AST 12 11/17/2016    ALT <5 11/17/2016       POC Tests: No results for input(s): POCGLU, POCNA, POCK, POCCL, POCBUN, POCHEMO, POCHCT in the last 72 hours. Coags:   Lab Results   Component Value Date    PROTIME 27.8 05/11/2015    INR 2.5 05/11/2015       HCG (If Applicable): No results found for: PREGTESTUR, PREGSERUM, HCG, HCGQUANT     ABGs: No results found for: PHART, PO2ART, OCA4VNB, LFA8ISI, BEART, G8CBENXO     Type & Screen (If Applicable):  No results found for: [facility]    Anesthesia Evaluation  Patient summary reviewed and Nursing notes reviewed no history of anesthetic complications:   Airway: Mallampati: II  TM distance: >3 FB   Neck ROM: full  Mouth opening: > = 3 FB Dental:    (+) other      Pulmonary: breath sounds clear to auscultation      (-) not a current smoker          Patient did not smoke on day of surgery. Cardiovascular:  Exercise tolerance: poor (<4 METS),   (+) hypertension:, pacemaker: pacemaker and AICD, CAD:, CHF: no interval change, murmur,       ECG reviewed      Echocardiogram reviewed  Stress test reviewed  Cleared by cardiology     Beta Blocker:  Dose within 24 Hrs         Neuro/Psych:   Negative Neuro/Psych ROS              GI/Hepatic/Renal:   (+) renal disease (Tue/Thurs/Sat last treatment 11/21): ESRD,      (-) GERD       Endo/Other: Negative Endo/Other ROS             Pt had PAT visit.        Abdominal:           Vascular: negative vascular SYBIL JARQUIN 5.0    Anesthesia Plan      MAC     ASA 4       Induction: intravenous. MIPS: Prophylactic antiemetics administered. Anesthetic plan and risks discussed with patient.                     Lauren Gooden CRNA   11/22/2017

## 2017-11-22 NOTE — H&P
Patient here for new dialysis access. Has a failed access in the left upper arm. Now presents for right arm AVG. Currently has a catheter in place. No chief complaint on file. Patient Active Problem List   Diagnosis    Anemia, iron deficiency    Non-ischemic cardiomyopathy ([address]    Chronic combined systolic and diastolic CHF, NYHA class 3 ([address]    Implantable defibrillator reprogramming/check    Chronotropic incompetence with sinus node dysfunction (HCC)    Frequent PVCs    Hypotension due to drugs    Mixed hyperlipidemia    Syncope    Dysautonomia orthostatic hypotension syndrome (HCC)    Chronotropic incompetence with autonomic dysfunction    LORRAINE (acute kidney injury) ([address]    Hearing loss of left ear    Dehydration, mild    Preop cardiovascular exam    ESRD (end stage renal disease) on dialysis [facility])       Past Medical History:   Diagnosis Date    CAD (coronary artery disease)     Cancer ([address] 2013    left ear    Chronic combined systolic and diastolic CHF, NYHA class 3 (HCC)     EF 25-30%    CRF (chronic renal failure) / stage 4 4/23/2013    Dysautonomia orthostatic hypotension syndrome ([address] 5/12/2016    Hemodialysis patient ([facility]     Tuesday, Thursday, Saturday -- Robert    History of blood transfusion 2013    History of echocardiogram 6/6/13    LMCA: mild irreg 10-20%, LAD: mild irreg 10-20%, LCx: mild irreg 10-20%, RCA: mild irreg 10-20% The conus branch of the RCA had separate ostis off of the aorta, prly of little if any clinical significance.     ICD (implantable cardiac defibrillator), single, in situ 12/16/13    Biotronic    Mixed hyperlipidemia     Non-ischemic cardiomyopathy ([address] 6/10/2013    EF 25-30%     Restless leg syndrome     Systolic CHF, acute on chronic ([address] 4/23/2013     Past Surgical History:   Procedure Laterality Date    CARDIAC CATHETERIZATION  6/6/2013    Dr. Debby Alexander  2013    DIALYSIS FISTULA CREATION  01/18/2017 Left Upper Arm- Dr. Palomo Martinez, COLON, DIAGNOSTIC      EXCISION / BIOPSY SKIN LESION OF HEAD / NECK Left 6/19/2017    NECK LESION BIOPSY EXCISION, BCC, FROZEN SECTION performed by Inna Vilchis MD at [address] [place] [address] Right HIP    OTHER SURGICAL HISTORY  11/2016    Right chest port for dialysis    PACEMAKER PLACEMENT  2013    AICD    PRE-MALIGNANT / BENIGN SKIN LESION EXCISION Left 06/19/2017    Neck area    SKIN BIOPSY  9-    left axila    SKIN CANCER EXCISION Left 06/05/2015    Ear    TYMPANOPLASTY Left 05/12/2016    per Dr. Maru Randall History     Social History    Marital status: Single     Spouse name: N/A    Number of children: N/A    Years of education: N/A     Occupational History    Not on file.      Social History Main Topics    Smoking status: Never Smoker    Smokeless tobacco: Never Used    Alcohol use 0.0 oz/week     3 - 4 Cans of beer per week      Comment: daily drinker BEER 3-4    Drug use:       Comment: Remote years ago    Sexual activity: Not on file     Other Topics Concern    Not on file     Social History Narrative    No narrative on file     Family History   Problem Relation Age of Onset    Heart Disease Father      heart attack in 1988       Constitutional:  Denies fever or chills   Eyes:  Denies change in visual acuity   HENT:  Denies nasal congestion or sore throat   Respiratory:  Denies cough or shortness of breath   Cardiovascular:  Denies chest pain or edema   GI:  Denies abdominal pain, nausea, vomiting, bloody stools or diarrhea   :  Denies dysuria or frequency   Musculoskeletal:  Denies back pain or joint pain   Integument:  Denies rash   Neurologic:  Denies headache, focal weakness or sensory changes   Endocrine:  Denies polyuria or polydipsia   Lymphatic:  Denies swollen glands   Psychiatric:  Denies depression or anxiety     Physical Exam  HEENT - Perrla, Eomi  Neck- Supple, no TM  Carotids - carotids without bruits

## 2017-11-22 NOTE — OP NOTE
Operative Note 11/22/2017    Shaina Babin  YOB: 1950  [id number]    Pre-procedure Diagnosis:  ESRD with need for permanent dialysis access    Post-procedure Diagnosis: Same    Procedure: right Brachial artery to Axillary vein AV graft with 4X7mm Propaten graft        Anesthesia: MAC    Surgeons/Assistants: Guero Ro    Estimated Blood Loss: Minimal    Complications: none    Specimens: were not obtained    Indications and consent: This is a 69y.o. year old male with end stage renal disease. The patient requires permanent dialysis access. Preoperative vein mapping was performed and shows no appropriate veins for primary AVF creation. A graft was recommended. Risks, benefits, and alternatives were discussed prior to the procedure and appropriate signed informed consent was obtained. Procedure: The patient was brought to the operating room and placed in a supine position. After adequate anesthesia and time out, the patient was positioned, prepped, and draped in the usual sterile fashion. The  right arm was anesthetized with 1% lidocaine solution. A longitudinal incision was made along the medial upper arm. Dissection through the subcutaneous tissue was carried out with electrocautery. The axillary vein was identified and sharply skeletonized. Proximal and distal control was obtained. A longitudinal incision was made directly over the brachial artery. Dissection through the subcutaneous tissue was carried out with electrocautery. The brachial artery was identified and sharply skeletonized. Proximal and distal control was obtained with vessel loops. The graft was then tunneled with a Tom Pod tunneler taking care not to twist or kink the graft. The artery was occluded. A longitudinal arteriotomy was made approximately 5mm in length. The artery was flushed in both directions with heparinized saline. The graft was trimmed and beveled to length.   An end-to-side anastomosis was performed with 6-0 running prolene sutures. Just prior to completion of the suture line, the artery was allowed to back bleed from both directions to flush any air or debris. The suture line was then completed. A graft clamp was applied and flow was restored to the distal brachial artery. The axillary vein was then occluded. The graft was trimmed and beveled to length. A longitudinal venotomy was made. An end-to-side anastomosis was performed with running 6-0 prolene suture. Just prior to completion of the suture line, the graft was allowed to bleed, flushing any air or debris. The suture line was then completed and flow allowed through the graft. Hemostasis was obtained from both anastomotic sites using fibrillar surgicell and mild pressure. There was a strong palpable thrill over the graft. Flow was maintained in the distal extremity with the hand warm and well perfused. Once excellent hemostasis was assured, the incisions were closed in layers using 3-0 Vicryl and 4-0 Monocryl subcuticular sutures. Steri strips and a sterile dressings were applied. The patient tolerated the procedure well and was taken to recovery in stable condition. I directly performed and/or supervised the entire procedure.                 Sandy Lock11/22/2017 3:08 PM

## 2017-12-28 ENCOUNTER — HOSPITAL ENCOUNTER (OUTPATIENT)
Dept: INTERVENTIONAL RADIOLOGY/VASCULAR | Age: 67
Discharge: HOME | End: 2017-12-28
Payer: MEDICARE

## 2017-12-28 VITALS
OXYGEN SATURATION: 99 % | RESPIRATION RATE: 16 BRPM | HEART RATE: 75 BPM | TEMPERATURE: 98 F | SYSTOLIC BLOOD PRESSURE: 109 MMHG | DIASTOLIC BLOOD PRESSURE: 58 MMHG

## 2017-12-28 DIAGNOSIS — N18.6 ESRD (END STAGE RENAL DISEASE) (HCC): ICD-10-CM

## 2017-12-28 DIAGNOSIS — I77.0 A-V FISTULA (HCC): ICD-10-CM

## 2017-12-28 PROCEDURE — 2500000003 HC RX 250 WO HCPCS: Performed by: RADIOLOGY

## 2017-12-28 PROCEDURE — 76000 FLUOROSCOPY <1 HR PHYS/QHP: CPT | Performed by: RADIOLOGY

## 2017-12-28 RX ORDER — LIDOCAINE HYDROCHLORIDE 10 MG/ML
INJECTION, SOLUTION EPIDURAL; INFILTRATION; INTRACAUDAL; PERINEURAL
Status: COMPLETED | OUTPATIENT
Start: 2017-12-28 | End: 2017-12-28

## 2017-12-28 RX ADMIN — LIDOCAINE HYDROCHLORIDE 6 ML: 10 INJECTION, SOLUTION EPIDURAL; INFILTRATION; INTRACAUDAL; PERINEURAL at 13:52

## 2017-12-28 ASSESSMENT — PAIN - FUNCTIONAL ASSESSMENT
PAIN_FUNCTIONAL_ASSESSMENT: 0-10
PAIN_FUNCTIONAL_ASSESSMENT: 0-10

## 2018-01-25 ENCOUNTER — NURSE ONLY (OUTPATIENT)
Dept: CARDIOLOGY | Age: 68
End: 2018-01-25
Payer: MEDICARE

## 2018-01-25 DIAGNOSIS — Z95.810 ICD (IMPLANTABLE CARDIOVERTER-DEFIBRILLATOR) IN PLACE: ICD-10-CM

## 2018-01-25 DIAGNOSIS — I49.5 CHRONOTROPIC INCOMPETENCE WITH SINUS NODE DYSFUNCTION (HCC): ICD-10-CM

## 2018-01-25 DIAGNOSIS — I42.8 NON-ISCHEMIC CARDIOMYOPATHY (HCC): Primary | ICD-10-CM

## 2018-01-25 PROCEDURE — 93295 DEV INTERROG REMOTE 1/2/MLT: CPT | Performed by: FAMILY MEDICINE

## 2018-01-25 PROCEDURE — 93296 REM INTERROG EVL PM/IDS: CPT | Performed by: FAMILY MEDICINE

## 2018-01-31 ENCOUNTER — TELEPHONE (OUTPATIENT)
Dept: CARDIOLOGY | Age: 68
End: 2018-01-31

## 2018-02-01 DIAGNOSIS — I42.8 NON-ISCHEMIC CARDIOMYOPATHY (HCC): Primary | ICD-10-CM

## 2018-02-01 DIAGNOSIS — G90.8 CHRONOTROPIC INCOMPETENCE WITH AUTONOMIC DYSFUNCTION: ICD-10-CM

## 2018-02-03 ENCOUNTER — HOSPITAL ENCOUNTER (OUTPATIENT)
Age: 68
Setting detail: SPECIMEN
Discharge: HOME | End: 2018-02-03
Payer: MEDICARE

## 2018-02-03 DIAGNOSIS — I50.42 CHRONIC COMBINED SYSTOLIC AND DIASTOLIC CHF, NYHA CLASS 3 (HCC): ICD-10-CM

## 2018-02-03 LAB
INR BLD: 1.1 (ref 0.9–1.2)
PROTHROMBIN TIME: 10.9 SEC (ref 9.7–12.2)

## 2018-02-03 PROCEDURE — 85610 PROTHROMBIN TIME: CPT

## 2018-03-13 ENCOUNTER — HOSPITAL ENCOUNTER (OUTPATIENT)
Age: 68
Setting detail: SPECIMEN
Discharge: HOME | End: 2018-03-13
Payer: MEDICARE

## 2018-03-13 LAB — POTASSIUM SERPL-SCNC: 4.7 MMOL/L (ref 3.7–5.3)

## 2018-03-13 PROCEDURE — 84132 ASSAY OF SERUM POTASSIUM: CPT

## 2018-05-01 ENCOUNTER — OFFICE VISIT (OUTPATIENT)
Dept: CARDIOLOGY | Age: 68
End: 2018-05-01
Payer: MEDICARE

## 2018-05-01 VITALS
BODY MASS INDEX: 23.94 KG/M2 | DIASTOLIC BLOOD PRESSURE: 66 MMHG | RESPIRATION RATE: 20 BRPM | HEIGHT: 71 IN | SYSTOLIC BLOOD PRESSURE: 108 MMHG | OXYGEN SATURATION: 98 % | HEART RATE: 73 BPM | WEIGHT: 171 LBS

## 2018-05-01 DIAGNOSIS — I49.5 CHRONOTROPIC INCOMPETENCE WITH SINUS NODE DYSFUNCTION (HCC): ICD-10-CM

## 2018-05-01 DIAGNOSIS — I50.42 CHRONIC COMBINED SYSTOLIC AND DIASTOLIC CHF, NYHA CLASS 3 (HCC): Primary | ICD-10-CM

## 2018-05-01 DIAGNOSIS — I42.8 NON-ISCHEMIC CARDIOMYOPATHY (HCC): ICD-10-CM

## 2018-05-01 DIAGNOSIS — I95.1 DYSAUTONOMIA ORTHOSTATIC HYPOTENSION SYNDROME: ICD-10-CM

## 2018-05-01 PROCEDURE — 99214 OFFICE O/P EST MOD 30 MIN: CPT | Performed by: FAMILY MEDICINE

## 2018-05-01 PROCEDURE — 93295 DEV INTERROG REMOTE 1/2/MLT: CPT | Performed by: INTERNAL MEDICINE

## 2018-05-01 PROCEDURE — 93296 REM INTERROG EVL PM/IDS: CPT | Performed by: INTERNAL MEDICINE

## 2018-05-01 PROCEDURE — 4040F PNEUMOC VAC/ADMIN/RCVD: CPT | Performed by: FAMILY MEDICINE

## 2018-05-01 PROCEDURE — 1123F ACP DISCUSS/DSCN MKR DOCD: CPT | Performed by: FAMILY MEDICINE

## 2018-05-01 PROCEDURE — 3017F COLORECTAL CA SCREEN DOC REV: CPT | Performed by: FAMILY MEDICINE

## 2018-05-01 PROCEDURE — G8427 DOCREV CUR MEDS BY ELIG CLIN: HCPCS | Performed by: FAMILY MEDICINE

## 2018-05-01 PROCEDURE — 1036F TOBACCO NON-USER: CPT | Performed by: FAMILY MEDICINE

## 2018-05-01 PROCEDURE — G8420 CALC BMI NORM PARAMETERS: HCPCS | Performed by: FAMILY MEDICINE

## 2018-05-03 ENCOUNTER — NURSE ONLY (OUTPATIENT)
Dept: CARDIOLOGY | Age: 68
End: 2018-05-03
Payer: MEDICARE

## 2018-05-03 ENCOUNTER — TELEPHONE (OUTPATIENT)
Dept: CARDIOLOGY | Age: 68
End: 2018-05-03

## 2018-05-03 DIAGNOSIS — I42.8 NON-ISCHEMIC CARDIOMYOPATHY (HCC): ICD-10-CM

## 2018-05-03 DIAGNOSIS — Z95.810 CARDIAC DEFIBRILLATOR IN PLACE: Primary | ICD-10-CM

## 2018-06-29 ENCOUNTER — OFFICE VISIT (OUTPATIENT)
Dept: CARDIOLOGY | Age: 68
End: 2018-06-29
Payer: MEDICARE

## 2018-06-29 VITALS
HEART RATE: 77 BPM | HEIGHT: 71 IN | RESPIRATION RATE: 18 BRPM | SYSTOLIC BLOOD PRESSURE: 108 MMHG | WEIGHT: 175 LBS | BODY MASS INDEX: 24.5 KG/M2 | DIASTOLIC BLOOD PRESSURE: 68 MMHG | OXYGEN SATURATION: 98 %

## 2018-06-29 DIAGNOSIS — I50.42 CHRONIC COMBINED SYSTOLIC AND DIASTOLIC CHF, NYHA CLASS 3 (HCC): Primary | ICD-10-CM

## 2018-06-29 DIAGNOSIS — I48.0 PAROXYSMAL A-FIB (HCC): ICD-10-CM

## 2018-06-29 DIAGNOSIS — Z95.810 ICD (IMPLANTABLE CARDIOVERTER-DEFIBRILLATOR) IN PLACE: ICD-10-CM

## 2018-06-29 DIAGNOSIS — I95.1 DYSAUTONOMIA ORTHOSTATIC HYPOTENSION SYNDROME: ICD-10-CM

## 2018-06-29 DIAGNOSIS — I48.0 PAF (PAROXYSMAL ATRIAL FIBRILLATION) (HCC): ICD-10-CM

## 2018-06-29 DIAGNOSIS — Z79.01 LONG TERM CURRENT USE OF ANTICOAGULANT: ICD-10-CM

## 2018-06-29 PROCEDURE — 1036F TOBACCO NON-USER: CPT | Performed by: FAMILY MEDICINE

## 2018-06-29 PROCEDURE — 99214 OFFICE O/P EST MOD 30 MIN: CPT | Performed by: FAMILY MEDICINE

## 2018-06-29 PROCEDURE — G8420 CALC BMI NORM PARAMETERS: HCPCS | Performed by: FAMILY MEDICINE

## 2018-06-29 PROCEDURE — 1123F ACP DISCUSS/DSCN MKR DOCD: CPT | Performed by: FAMILY MEDICINE

## 2018-06-29 PROCEDURE — G8427 DOCREV CUR MEDS BY ELIG CLIN: HCPCS | Performed by: FAMILY MEDICINE

## 2018-06-29 PROCEDURE — 3017F COLORECTAL CA SCREEN DOC REV: CPT | Performed by: FAMILY MEDICINE

## 2018-06-29 PROCEDURE — 4040F PNEUMOC VAC/ADMIN/RCVD: CPT | Performed by: FAMILY MEDICINE

## 2018-06-29 RX ORDER — WARFARIN SODIUM 5 MG/1
5 TABLET ORAL DAILY
Qty: 90 TABLET | Refills: 3 | Status: SHIPPED | OUTPATIENT
Start: 2018-06-29 | End: 2019-06-20 | Stop reason: SDUPTHER

## 2018-07-02 ENCOUNTER — HOSPITAL ENCOUNTER (OUTPATIENT)
Dept: PHARMACY | Age: 68
Setting detail: THERAPIES SERIES
Discharge: HOME | End: 2018-07-02
Payer: MEDICARE

## 2018-07-02 VITALS
SYSTOLIC BLOOD PRESSURE: 118 MMHG | BODY MASS INDEX: 23.57 KG/M2 | DIASTOLIC BLOOD PRESSURE: 71 MMHG | HEART RATE: 82 BPM | WEIGHT: 169 LBS

## 2018-07-02 DIAGNOSIS — Z79.01 LONG TERM CURRENT USE OF ANTICOAGULANT: ICD-10-CM

## 2018-07-02 DIAGNOSIS — I48.0 PAROXYSMAL A-FIB (HCC): ICD-10-CM

## 2018-07-02 LAB — INR BLD: 1.5

## 2018-07-02 PROCEDURE — 85610 PROTHROMBIN TIME: CPT

## 2018-07-02 PROCEDURE — 99211 OFF/OP EST MAY X REQ PHY/QHP: CPT

## 2018-07-02 NOTE — PROGRESS NOTES
Patient is new to anticoagulation clinic referred by Dr. Damian Barajas with diagnosis paroxysmal atrial fibrillation. Patient started warfarin 5 mg daily on 6/29/18. He has had 3 doses (15 mg total). INR is 1.5 today. Patient has dialysis T-R-Sat in [place] at [facility]. Dr. Joel Cason is his nephrologist.    Fingerstick INR drawn per clinic protocol. Patient states no visible blood in urine and no black tarry stool. Denies any missed doses of warfarin. Patient will take warfarin 5 mg today and tomorrow (7/2 and 7/3/18), 2.5 mg on Wednesday (7/4/18), and 5 mg on Thursday (7/5/18). Patient will return to clinic on Friday, 7/6/18, to recheck INR. Warfarin Patient Education: The following education has been conducted during the clinic appointment as indicated. Meryl Hollis. Goyo Milan 7/2/2018, 8:23 AM  1. Patient has been educated that warfarin is a blood thinner. 2. Patient has been educated on why he or she has been prescribed warfarin. 3. Patient has been educated that warfarin can cause bleeding. 4. Patient has been educated on the necessity of regular INR monitoring. 5. Patient has been educated to take the exact amount of warfarin prescribed each day and that the dosing regimen could possibly vary from day to day. 6. Patient has been educated that medication aids such as pill boxes, calendars, etc., are a good idea to aid with warfarin therapy. 7. Patient has been educated on his or her target INR range. 8. Patient has been educated on the signs of bleeding problems. 9. Patient has been educated to seek immediate medical attention for severe bleeding issues. 10. Patient has been educated that warfarin has many drug interactions. 11. Patient has been educated to notify their provider managing warfarin prior to taking any new medications, including over-the-counter products and herbal products.     12. Patient has been educated to always keep a current medications list.

## 2018-07-06 ENCOUNTER — HOSPITAL ENCOUNTER (OUTPATIENT)
Dept: PHARMACY | Age: 68
Setting detail: THERAPIES SERIES
Discharge: HOME | End: 2018-07-06
Payer: MEDICARE

## 2018-07-06 VITALS — SYSTOLIC BLOOD PRESSURE: 88 MMHG | HEART RATE: 70 BPM | DIASTOLIC BLOOD PRESSURE: 53 MMHG

## 2018-07-06 DIAGNOSIS — I48.0 PAROXYSMAL A-FIB (HCC): ICD-10-CM

## 2018-07-06 DIAGNOSIS — Z79.01 LONG TERM CURRENT USE OF ANTICOAGULANT: ICD-10-CM

## 2018-07-06 LAB — INR BLD: 2.4

## 2018-07-06 PROCEDURE — 85610 PROTHROMBIN TIME: CPT

## 2018-07-06 PROCEDURE — 99211 OFF/OP EST MAY X REQ PHY/QHP: CPT

## 2018-07-06 NOTE — PATIENT INSTRUCTIONS
Continue current dose of warfarin as instructed on dosing calendar provided. Please take warfarin 2.5 mg on Wednesday; 5 mg all other days. Continue to monitor urine and stool for signs and symptoms of bleeding. Please notify the clinic of any medication changes. Please remember to bring all medications (both prescription and OTC) to your next visit. Kindly notify the clinic if you are unable to make to your next appointment.

## 2018-07-13 ENCOUNTER — HOSPITAL ENCOUNTER (OUTPATIENT)
Dept: PHARMACY | Age: 68
Setting detail: THERAPIES SERIES
Discharge: HOME | End: 2018-07-13
Payer: MEDICARE

## 2018-07-13 VITALS — SYSTOLIC BLOOD PRESSURE: 102 MMHG | HEART RATE: 99 BPM | DIASTOLIC BLOOD PRESSURE: 61 MMHG

## 2018-07-13 DIAGNOSIS — I48.0 PAROXYSMAL A-FIB (HCC): ICD-10-CM

## 2018-07-13 DIAGNOSIS — Z79.01 LONG TERM CURRENT USE OF ANTICOAGULANT: ICD-10-CM

## 2018-07-13 LAB — INR BLD: 5.3

## 2018-07-13 PROCEDURE — 85610 PROTHROMBIN TIME: CPT

## 2018-07-13 PROCEDURE — 99211 OFF/OP EST MAY X REQ PHY/QHP: CPT

## 2018-07-13 NOTE — PATIENT INSTRUCTIONS
Please hold warfarin today and tomorrow. Take warfarin 5 mg on Sunday. Continue to monitor urine and stool for signs and symptoms of bleeding. Please notify the clinic of any medication changes. Please remember to bring all medications (both prescription and OTC) to your next visit. Kindly notify the clinic if you are unable to make to your next appointment.

## 2018-07-13 NOTE — PROGRESS NOTES
Fingerstick INR drawn per clinic protocol. Patient states no visible blood in urine and no black tarry stool. Denies any missed doses of warfarin. No change in other maintenance medications or in diet. INR is 5.3 today. Patient states he drinks alcohol at the [employer] every day, but has not changed the amount. Patient states he drinks 1 or 2 beers and 2 white [place] drinks when he goes to the [employer]. Patient will hold warfarin doses today and tomorrow and take warfarin 5 mg on Sunday. Patient will return to clinic to recheck INR on Monday.

## 2018-07-16 ENCOUNTER — HOSPITAL ENCOUNTER (OUTPATIENT)
Dept: PHARMACY | Age: 68
Setting detail: THERAPIES SERIES
Discharge: HOME | End: 2018-07-16
Payer: MEDICARE

## 2018-07-16 ENCOUNTER — TELEPHONE (OUTPATIENT)
Dept: CARDIOLOGY | Age: 68
End: 2018-07-16

## 2018-07-16 VITALS — HEART RATE: 82 BPM | SYSTOLIC BLOOD PRESSURE: 114 MMHG | DIASTOLIC BLOOD PRESSURE: 87 MMHG

## 2018-07-16 DIAGNOSIS — I48.0 PAROXYSMAL A-FIB (HCC): ICD-10-CM

## 2018-07-16 DIAGNOSIS — Z79.01 LONG TERM CURRENT USE OF ANTICOAGULANT: ICD-10-CM

## 2018-07-16 LAB — INR BLD: 4

## 2018-07-16 PROCEDURE — 99211 OFF/OP EST MAY X REQ PHY/QHP: CPT

## 2018-07-16 PROCEDURE — 85610 PROTHROMBIN TIME: CPT

## 2018-07-16 NOTE — PATIENT INSTRUCTIONS
Please hold warfarin dose today. Take 2.5 mg Tuesday, Wednesday and Thursday. Return to clinic on Friday, 7/20/18, at 8 am.  Continue to monitor urine and stool for signs and symptoms of bleeding. Please notify the clinic of any medication changes. Please remember to bring all medications (both prescription and OTC) to your next visit. Kindly notify the clinic if you are unable to make to your next appointment.

## 2018-07-16 NOTE — PROGRESS NOTES
Fingerstick INR drawn per clinic protocol. Patient states no visible blood in urine and no black tarry stool. No change in other maintenance medications or in diet. INR is 4 today. Questioned patient about alcohol intake. Patient stated again that he drinks 2 beers and 2 white [place] drinks daily at the [employer], but also stated that is the amount he drinks each time he goes there. Patient states he goes to [employer] [facility] - doubling daily alcohol intake. Patient will hold warfarin today, then take 2.5 mg daily for 3 days. Patient will return to clinic on Friday, 7/20/18, to recheck INR.

## 2018-07-19 ENCOUNTER — NURSE ONLY (OUTPATIENT)
Dept: CARDIOLOGY | Age: 68
End: 2018-07-19

## 2018-07-19 DIAGNOSIS — I42.8 NON-ISCHEMIC CARDIOMYOPATHY (HCC): ICD-10-CM

## 2018-07-19 DIAGNOSIS — Z95.810 ICD (IMPLANTABLE CARDIOVERTER-DEFIBRILLATOR) IN PLACE: Primary | ICD-10-CM

## 2018-07-20 ENCOUNTER — HOSPITAL ENCOUNTER (OUTPATIENT)
Dept: PHARMACY | Age: 68
Setting detail: THERAPIES SERIES
Discharge: HOME | End: 2018-07-20
Payer: MEDICARE

## 2018-07-20 VITALS — HEART RATE: 71 BPM | SYSTOLIC BLOOD PRESSURE: 88 MMHG | DIASTOLIC BLOOD PRESSURE: 56 MMHG

## 2018-07-20 DIAGNOSIS — Z79.01 LONG TERM CURRENT USE OF ANTICOAGULANT: ICD-10-CM

## 2018-07-20 DIAGNOSIS — I48.0 PAROXYSMAL A-FIB (HCC): ICD-10-CM

## 2018-07-20 LAB — INR BLD: 2.5

## 2018-07-20 PROCEDURE — 99211 OFF/OP EST MAY X REQ PHY/QHP: CPT | Performed by: FAMILY MEDICINE

## 2018-07-20 PROCEDURE — 85610 PROTHROMBIN TIME: CPT | Performed by: FAMILY MEDICINE

## 2018-07-27 ENCOUNTER — HOSPITAL ENCOUNTER (OUTPATIENT)
Dept: PHARMACY | Age: 68
Setting detail: THERAPIES SERIES
Discharge: HOME | End: 2018-07-27
Payer: MEDICARE

## 2018-07-27 VITALS
SYSTOLIC BLOOD PRESSURE: 108 MMHG | HEART RATE: 73 BPM | DIASTOLIC BLOOD PRESSURE: 57 MMHG | WEIGHT: 164 LBS | BODY MASS INDEX: 22.87 KG/M2

## 2018-07-27 DIAGNOSIS — Z79.01 LONG TERM CURRENT USE OF ANTICOAGULANT: ICD-10-CM

## 2018-07-27 DIAGNOSIS — I48.0 PAROXYSMAL A-FIB (HCC): ICD-10-CM

## 2018-07-27 LAB — INR BLD: 2

## 2018-07-27 PROCEDURE — 99211 OFF/OP EST MAY X REQ PHY/QHP: CPT

## 2018-07-27 PROCEDURE — 85610 PROTHROMBIN TIME: CPT

## 2018-07-30 ENCOUNTER — TELEPHONE (OUTPATIENT)
Dept: CARDIOLOGY | Age: 68
End: 2018-07-30

## 2018-08-08 ENCOUNTER — HOSPITAL ENCOUNTER (OUTPATIENT)
Dept: PHARMACY | Age: 68
Setting detail: THERAPIES SERIES
Discharge: HOME | End: 2018-08-08
Payer: MEDICARE

## 2018-08-08 VITALS
HEART RATE: 82 BPM | DIASTOLIC BLOOD PRESSURE: 49 MMHG | SYSTOLIC BLOOD PRESSURE: 98 MMHG | WEIGHT: 164 LBS | BODY MASS INDEX: 22.87 KG/M2

## 2018-08-08 DIAGNOSIS — I48.0 PAROXYSMAL A-FIB (HCC): ICD-10-CM

## 2018-08-08 DIAGNOSIS — Z79.01 LONG TERM CURRENT USE OF ANTICOAGULANT: ICD-10-CM

## 2018-08-08 LAB — INR BLD: 1.6

## 2018-08-08 PROCEDURE — 99211 OFF/OP EST MAY X REQ PHY/QHP: CPT

## 2018-08-08 PROCEDURE — 85610 PROTHROMBIN TIME: CPT

## 2018-08-08 NOTE — PATIENT INSTRUCTIONS
Continue to monitor urine and stool. Continue to monitor for signs of bleeding. Return to clinic in 2 weeks. Increase his dose of warfarin to 5 mg on Wed and 2.5 mg all other days.

## 2018-08-16 ENCOUNTER — OFFICE VISIT (OUTPATIENT)
Dept: CARDIOLOGY | Age: 68
End: 2018-08-16
Payer: MEDICARE

## 2018-08-16 VITALS
OXYGEN SATURATION: 96 % | BODY MASS INDEX: 22.68 KG/M2 | RESPIRATION RATE: 16 BRPM | SYSTOLIC BLOOD PRESSURE: 94 MMHG | HEIGHT: 71 IN | WEIGHT: 162 LBS | HEART RATE: 81 BPM | DIASTOLIC BLOOD PRESSURE: 54 MMHG

## 2018-08-16 DIAGNOSIS — I42.8 NON-ISCHEMIC CARDIOMYOPATHY (HCC): ICD-10-CM

## 2018-08-16 DIAGNOSIS — I48.0 PAF (PAROXYSMAL ATRIAL FIBRILLATION) (HCC): Primary | ICD-10-CM

## 2018-08-16 DIAGNOSIS — I50.42 CHRONIC COMBINED SYSTOLIC AND DIASTOLIC CHF, NYHA CLASS 3 (HCC): ICD-10-CM

## 2018-08-16 DIAGNOSIS — Z95.810 ICD (IMPLANTABLE CARDIOVERTER-DEFIBRILLATOR) IN PLACE: ICD-10-CM

## 2018-08-16 PROCEDURE — G8420 CALC BMI NORM PARAMETERS: HCPCS | Performed by: FAMILY MEDICINE

## 2018-08-16 PROCEDURE — 1101F PT FALLS ASSESS-DOCD LE1/YR: CPT | Performed by: FAMILY MEDICINE

## 2018-08-16 PROCEDURE — 99213 OFFICE O/P EST LOW 20 MIN: CPT | Performed by: FAMILY MEDICINE

## 2018-08-16 PROCEDURE — 1123F ACP DISCUSS/DSCN MKR DOCD: CPT | Performed by: FAMILY MEDICINE

## 2018-08-16 PROCEDURE — 4040F PNEUMOC VAC/ADMIN/RCVD: CPT | Performed by: FAMILY MEDICINE

## 2018-08-16 PROCEDURE — 3017F COLORECTAL CA SCREEN DOC REV: CPT | Performed by: FAMILY MEDICINE

## 2018-08-16 PROCEDURE — G8427 DOCREV CUR MEDS BY ELIG CLIN: HCPCS | Performed by: FAMILY MEDICINE

## 2018-08-16 PROCEDURE — 1036F TOBACCO NON-USER: CPT | Performed by: FAMILY MEDICINE

## 2018-08-16 RX ORDER — FUROSEMIDE 80 MG
TABLET ORAL
Refills: 3 | COMMUNITY
Start: 2018-06-14 | End: 2018-11-19 | Stop reason: ALTCHOICE

## 2018-08-16 NOTE — PROGRESS NOTES
 CRF (chronic renal failure) / stage 4 4/23/2013    Dysautonomia orthostatic hypotension syndrome ([facility] 5/12/2016    Hemodialysis patient ([facility]     Tuesday, Thursday, Saturday -- [facility]    History of blood transfusion 2013    History of echocardiogram 6/6/13    LMCA: mild irreg 10-20%, LAD: mild irreg 10-20%, LCx: mild irreg 10-20%, RCA: mild irreg 10-20% The conus branch of the RCA had separate ostis off of the aorta, prly of little if any clinical significance.  ICD (implantable cardiac defibrillator), single, in situ 12/16/13    Biotronic    Mixed hyperlipidemia     Non-ischemic cardiomyopathy ([facility] 6/10/2013    EF 25-30%     Restless leg syndrome     Systolic CHF, acute on chronic ([facility] 4/23/2013       CURRENT ALLERGIES: Ancef [cefazolin] and Marcaine [bupivacaine hcl] REVIEW OF SYSTEMS: 10 systems were reviewed. Pertinent positives and negatives as above, all else negative.      Past Surgical History:   Procedure Laterality Date    CARDIAC CATHETERIZATION  6/6/2013    Dr. Jose Alejandro Sam  2013    DIALYSIS FISTULA CREATION  01/18/2017    Left Upper Arm- Dr. Olivia Carranza Right 11/22/2017    RUE per Dr. Olivia Carranza Right 11/22/2017    AV FISTULA CREATION REVISION-UPPER EXTREMITY performed by Isha Davidson DO at [address] [place], COLON, DIAGNOSTIC      EXCISION / BIOPSY SKIN LESION OF HEAD / NECK Left 6/19/2017    NECK LESION BIOPSY EXCISION, BCC, FROZEN SECTION performed by Doris Hayden MD at [address] [facility] Right HIP    OTHER SURGICAL HISTORY  11/2016    Right chest port for dialysis    PACEMAKER PLACEMENT  2013    AICD    PRE-MALIGNANT / BENIGN SKIN LESION EXCISION Left 06/19/2017    Neck area    SKIN BIOPSY  9-    left axila    SKIN CANCER EXCISION Left 06/05/2015    Ear    TYMPANOPLASTY Left 05/12/2016    per Dr. Carlos Flannery    Social History:  Social History   Substance Use Topics    Smoking status: Never Smoker    Smokeless tobacco: Never Used    Alcohol use 0.0 oz/week     3 - 4 Cans of beer per week      Comment: daily drinker BEER 3-4        CURRENT MEDICATIONS:  Outpatient Prescriptions Marked as Taking for the 8/16/18 encounter (Office Visit) with Nyla Harp MD   Medication Sig Dispense Refill    Handicap Placard MISC by Does not apply route Duration; 4 years 1 each 0    rOPINIRole (REQUIP) 0.5 MG tablet Take 1 tablet by mouth nightly 90 tablet 1    warfarin (COUMADIN) 5 MG tablet Take 1 tablet by mouth daily (Patient taking differently: Take 5 mg by mouth daily Takes 5 mg everyday except Wednesday takes 10 mg once daily) 90 tablet 3    carbidopa-levodopa (SINEMET)  MG per tablet Take 1 tablet by mouth in the morning and take 2 take tablets in the evening 90 tablet 6    metoprolol tartrate (LOPRESSOR) 25 MG tablet Take 1 tablet by mouth 2 times daily 180 tablet 3    lisinopril (PRINIVIL;ZESTRIL) 5 MG tablet Take 0.5 tablets by mouth daily 45 tablet 3    sodium bicarbonate 650 MG tablet TAKE 1 TABLET BY MOUTH EVERY DAY  6    allopurinol (ZYLOPRIM) 100 MG tablet Take 1 tablet by mouth daily 30 tablet 5    midodrine (PROAMATINE) 5 MG tablet Take 1 tablet by mouth 2 times daily (with meals) (Patient taking differently: Take 5 mg by mouth daily 1 tab prior to dialysis and 1 tab during dialysis (Tuesday Thursday and Saturday)) 90 tablet 3       FAMILY HISTORY: family history includes Heart Disease in his father. PHYSICAL EXAM:   BP (!) 94/54 (Site: Left Arm, Position: Sitting, Cuff Size: Medium Adult)   Pulse 81   Resp 16   Ht 5' 11\" (1.803 m)   Wt 162 lb (73.5 kg)   SpO2 96%   BMI 22.59 kg/m²  Body mass index is 22.59 kg/m². Constitutional: He is oriented to person, place, and time. He appears well-developed and well-nourished. In no acute distress. HEENT: Normocephalic and atraumatic. No JVD present. Carotid bruit is not present.  No mass and no thyromegaly office if this occurs. Stroke Risk: His CHADS2-VASc score is 3/9 (3.2% stroke risk)  Anticoagulation: Continue Warfain. Daily. Goal INR 2-3. I also reminded him to watch for signs of blood in his stool or black tarry stools and stop the medication immediately if this develops as this could be life threatening. Chronic Systolic and Diastolic Heart GICBOBU:NO:10-55%  Beta Blocker: Continue metoprolol succinate (Toprol XL) 25 mg once daily. ACE Inibitor/ARB: Continue lisinopril 2.5 mg once daily. Diuretics: Not indicated        Nonpharmacologic management of Heart Failure: I advised him to try and keep his legs up whenever possible and to limit salt in his diet. · Dysautonomia: currently well controlled. Continue current treatment. Implantable Cardioverter Defibrillator (ICD): Indication for Device Placement: Non Ischemic Cardiomyopathy (EF<35%)  Interrogation Findings via home monitoring: Abnormalities were seen including atrial fibrillation, but no changes were made    Finally, I recommended that he continue his other medications and follow up with you as previously scheduled. FOLLOW UP:   I told Mr. Constantine Marin to call my office if he had any problems, but otherwise told him to Return in about 2 months (around 10/16/2018). However, I would be happy to see him sooner should the need arise. Once again, thank you for allowing me to participate in this patients care. Please do not hesitate to contact me could I be of further assistance. Sincerely,  Codie Lucero MD, MS, F.A.C.C. [facility] Cardiology Specialist  [address] [place] [address], [address] [place] [address]  Phone: [phone], Fax: [phone]    I believe that the risk of significant morbidity and mortality related to the patient's current medical conditions are: intermediate. The documentation recorded by the scribe, accurately and completely reflects the services I personally performed and the decisions made by me. Shaquille Lucero MD, MS, URI.[email or web address].  8/16/2018

## 2018-08-23 ENCOUNTER — HOSPITAL ENCOUNTER (OUTPATIENT)
Dept: PHARMACY | Age: 68
Setting detail: THERAPIES SERIES
Discharge: HOME | End: 2018-08-23
Payer: MEDICARE

## 2018-08-23 DIAGNOSIS — Z79.01 LONG TERM CURRENT USE OF ANTICOAGULANT: ICD-10-CM

## 2018-08-23 DIAGNOSIS — I48.0 PAROXYSMAL A-FIB (HCC): ICD-10-CM

## 2018-08-23 LAB — INR BLD: 2.2

## 2018-08-23 PROCEDURE — 99211 OFF/OP EST MAY X REQ PHY/QHP: CPT | Performed by: FAMILY MEDICINE

## 2018-08-23 PROCEDURE — 85610 PROTHROMBIN TIME: CPT | Performed by: FAMILY MEDICINE

## 2018-08-23 NOTE — PROGRESS NOTES
Fingerstick INR drawn per clinic protocol. Patient states no visible blood in urine and no black tarry stool. Denies any missed doses of warfarin. No change in other maintenance medications or in diet. Will continue current warfarin regimen and recheck INR in 3 week(s). Patient acknowledges working in consult agreement with pharmacist as referred by his/her physician. Patient was delayed today due to fistula bleeding at dialysis. He had to wait over an hour to leave. He declines BP and weight check saying \"it was good before I left dialysis\".

## 2018-09-14 ENCOUNTER — HOSPITAL ENCOUNTER (OUTPATIENT)
Dept: PHARMACY | Age: 68
Setting detail: THERAPIES SERIES
Discharge: HOME | End: 2018-09-14
Payer: MEDICARE

## 2018-09-14 ENCOUNTER — TELEPHONE (OUTPATIENT)
Dept: CARDIOLOGY | Age: 68
End: 2018-09-14

## 2018-09-14 VITALS — DIASTOLIC BLOOD PRESSURE: 56 MMHG | HEART RATE: 107 BPM | SYSTOLIC BLOOD PRESSURE: 97 MMHG

## 2018-09-14 DIAGNOSIS — Z79.01 LONG TERM CURRENT USE OF ANTICOAGULANT: ICD-10-CM

## 2018-09-14 DIAGNOSIS — I48.0 PAROXYSMAL A-FIB (HCC): ICD-10-CM

## 2018-09-14 LAB — INR BLD: 1.9

## 2018-09-14 PROCEDURE — 99211 OFF/OP EST MAY X REQ PHY/QHP: CPT

## 2018-09-14 PROCEDURE — 85610 PROTHROMBIN TIME: CPT

## 2018-09-14 NOTE — TELEPHONE ENCOUNTER
Per Dr Radha baeza pts atrial fibrillation alert turned off via home monitoring. Turned off in [address] [place] [address].

## 2018-09-19 ENCOUNTER — HOSPITAL ENCOUNTER (EMERGENCY)
Age: 68
Discharge: LEFT BEFORE DISCHARGE | End: 2018-09-19
Attending: EMERGENCY MEDICINE
Payer: MEDICARE

## 2018-09-19 VITALS
SYSTOLIC BLOOD PRESSURE: 89 MMHG | OXYGEN SATURATION: 98 % | RESPIRATION RATE: 16 BRPM | HEART RATE: 113 BPM | DIASTOLIC BLOOD PRESSURE: 74 MMHG | TEMPERATURE: 97.8 F

## 2018-09-19 DIAGNOSIS — R06.00 DYSPNEA, UNSPECIFIED TYPE: Primary | ICD-10-CM

## 2018-09-19 PROCEDURE — 99284 EMERGENCY DEPT VISIT MOD MDM: CPT

## 2018-09-19 ASSESSMENT — ENCOUNTER SYMPTOMS
RESPIRATORY NEGATIVE: 1
GASTROINTESTINAL NEGATIVE: 1

## 2018-09-19 NOTE — ED PROVIDER NOTES
[facility] ED  eMERGENCY dEPARTMENT eNCOUnter      Pt Name: Riky Mcknight  MRN: [id number]  [place] 1950  Date of evaluation: 9/19/2018  Provider: Thaddeus Menard MD    CHIEF COMPLAINT       Chief Complaint   Patient presents with    Shortness of Breath     onset 1 week    Fatigue     dialysis pt; has been having issues with a low bp    Dizziness    Cough     productive         HISTORY OF PRESENT ILLNESS   (Location/Symptom, Timing/Onset, Context/Setting, Quality, Duration, Modifying Factors, Severity)  Note limiting factors. Riky Mcknight is a 69 y.o. male who presents to the emergency department      This is a 77-year-old male with end-stage renal disease on dialysis who presents withShortness of breath, dizziness upon standing and productive cough. He denies any chest pain or shortness of breath. He does not have a fever. He lastdialyzed yesterday. His blood pressure is marginally low but he does state that is also his baseline. Denies chest pain shortness of breath nausea vomiting or any other constitutional signs or symptoms            Nursing Notes were reviewed. REVIEW OF SYSTEMS    (2-9 systems for level 4, 10 or more for level 5)     Review of Systems   Constitutional: Negative. Respiratory: Negative. Cardiovascular: Negative. Gastrointestinal: Negative. Except as noted above the remainder of the review of systems was reviewed and negative.        PAST MEDICAL HISTORY     Past Medical History:   Diagnosis Date    CAD (coronary artery disease)     Cancer ([facility] 2013    left ear    Chronic combined systolic and diastolic CHF, NYHA class 3 ([facility]     EF 25-30%    CRF (chronic renal failure) / stage 4 4/23/2013    Dysautonomia orthostatic hypotension syndrome ([facility] [address] [facility] 5/12/2016    Hemodialysis patient ([facility] [address] [facility]     Tuesday, Thursday, Saturday -- [facility]    History of blood transfusion 2013    History of echocardiogram 6/6/13    LMCA: mild irreg 10-20%, LAD: mild Constitutional: He is oriented to person, place, and time. He appears well-developed and well-nourished. HENT:   Head: Normocephalic and atraumatic. Right Ear: External ear normal.   Left Ear: External ear normal.   Nose: Nose normal.   Eyes: Pupils are equal, round, and reactive to light. Conjunctivae and EOM are normal.   Neck: Normal range of motion. Neck supple. Cardiovascular: Normal rate, regular rhythm, normal heart sounds and intact distal pulses. Pulmonary/Chest: Effort normal and breath sounds normal.   Abdominal: Soft. Bowel sounds are normal. There is no tenderness. Genitourinary: Rectal exam shows guaiac positive stool (small abount of bright red blood on glove). Musculoskeletal: Normal range of motion. Neurological: He is alert and oriented to person, place, and time. Skin: Skin is warm and dry. Psychiatric: Judgment normal.       DIAGNOSTIC RESULTS     EKG: All EKG's are interpreted by the Emergency Department Physician who either signs or Co-signs this chart in the absence of a cardiologist.      RADIOLOGY:   Non-plain film images such as CT, Ultrasound and MRI are read by the radiologist. Plain radiographic images are visualized and preliminarily interpreted by the emergency physician with the below findings:      Interpretation per the Radiologist below, if available at the time of this note:    No orders to display         ED BEDSIDE ULTRASOUND:   Performed by ED Physician - none    LABS:  Labs Reviewed - No data to display    All other labs were within normal range or not returned as of this dictation. EMERGENCY DEPARTMENT COURSE and DIFFERENTIAL DIAGNOSIS/MDM:   Vitals:    Vitals:    09/19/18 1228 09/19/18 1240   BP: 89/74    Pulse: 113    Resp: 16    Temp:  97.8 °F (36.6 °C)   TempSrc: Tympanic Oral   SpO2: 98%          MDM            Procedures    FINAL IMPRESSION      1.  Dyspnea, unspecified type          DISPOSITION/PLAN   DISPOSITION Eloped - Left Before Treatment Complete 09/19/2018 01:48:07 PM      PATIENT REFERRED TO:  No follow-up provider specified. DISCHARGE MEDICATIONS:  Discharge Medication List as of 9/19/2018  1:48 PM                 Summation      Patient Course:      ED Medications administered this visit:  Medications - No data to display    New Prescriptions from this visit:    Discharge Medication List as of 9/19/2018  1:48 PM          Follow-up:  No follow-up provider specified. Final Impression:   1.  Dyspnea, unspecified type               (Please note that portions of this note were completed with a voice recognition program.  Efforts were made to edit the dictations but occasionally words are mis-transcribed.)           Leisa Mcdonald MD  09/25/18 2319

## 2018-09-27 PROBLEM — Z01.810 PREOP CARDIOVASCULAR EXAM: Status: RESOLVED | Noted: 2017-01-17 | Resolved: 2018-09-27

## 2018-10-05 ENCOUNTER — HOSPITAL ENCOUNTER (OUTPATIENT)
Dept: PHARMACY | Age: 68
Setting detail: THERAPIES SERIES
Discharge: HOME | End: 2018-10-05
Payer: MEDICARE

## 2018-10-05 ENCOUNTER — OFFICE VISIT (OUTPATIENT)
Dept: CARDIOLOGY | Age: 68
End: 2018-10-05
Payer: MEDICARE

## 2018-10-05 VITALS
HEIGHT: 72 IN | DIASTOLIC BLOOD PRESSURE: 63 MMHG | OXYGEN SATURATION: 98 % | SYSTOLIC BLOOD PRESSURE: 90 MMHG | HEART RATE: 112 BPM | WEIGHT: 166 LBS | BODY MASS INDEX: 22.48 KG/M2 | RESPIRATION RATE: 16 BRPM

## 2018-10-05 VITALS
SYSTOLIC BLOOD PRESSURE: 91 MMHG | DIASTOLIC BLOOD PRESSURE: 60 MMHG | WEIGHT: 162 LBS | HEART RATE: 102 BPM | BODY MASS INDEX: 22.59 KG/M2

## 2018-10-05 DIAGNOSIS — I48.0 PAROXYSMAL A-FIB (HCC): Primary | ICD-10-CM

## 2018-10-05 DIAGNOSIS — I50.42 CHRONIC COMBINED SYSTOLIC AND DIASTOLIC CHF, NYHA CLASS 3 (HCC): ICD-10-CM

## 2018-10-05 DIAGNOSIS — R42 LIGHT-HEADEDNESS: ICD-10-CM

## 2018-10-05 DIAGNOSIS — R55 VASOVAGAL SYNCOPE: ICD-10-CM

## 2018-10-05 DIAGNOSIS — I95.2 HYPOTENSION DUE TO DRUGS: ICD-10-CM

## 2018-10-05 DIAGNOSIS — I48.0 PAROXYSMAL A-FIB (HCC): ICD-10-CM

## 2018-10-05 DIAGNOSIS — Z79.01 LONG TERM CURRENT USE OF ANTICOAGULANT: ICD-10-CM

## 2018-10-05 LAB — INR BLD: 2.4

## 2018-10-05 PROCEDURE — G8427 DOCREV CUR MEDS BY ELIG CLIN: HCPCS | Performed by: FAMILY MEDICINE

## 2018-10-05 PROCEDURE — 99211 OFF/OP EST MAY X REQ PHY/QHP: CPT

## 2018-10-05 PROCEDURE — 1101F PT FALLS ASSESS-DOCD LE1/YR: CPT | Performed by: FAMILY MEDICINE

## 2018-10-05 PROCEDURE — 1123F ACP DISCUSS/DSCN MKR DOCD: CPT | Performed by: FAMILY MEDICINE

## 2018-10-05 PROCEDURE — 85610 PROTHROMBIN TIME: CPT

## 2018-10-05 PROCEDURE — G8484 FLU IMMUNIZE NO ADMIN: HCPCS | Performed by: FAMILY MEDICINE

## 2018-10-05 PROCEDURE — 99214 OFFICE O/P EST MOD 30 MIN: CPT | Performed by: FAMILY MEDICINE

## 2018-10-05 PROCEDURE — G8420 CALC BMI NORM PARAMETERS: HCPCS | Performed by: FAMILY MEDICINE

## 2018-10-05 PROCEDURE — 4040F PNEUMOC VAC/ADMIN/RCVD: CPT | Performed by: FAMILY MEDICINE

## 2018-10-05 PROCEDURE — 1036F TOBACCO NON-USER: CPT | Performed by: FAMILY MEDICINE

## 2018-10-05 PROCEDURE — 3017F COLORECTAL CA SCREEN DOC REV: CPT | Performed by: FAMILY MEDICINE

## 2018-10-05 NOTE — PROGRESS NOTES
Stephanie Herrmann am scribing for and in the presence of Maria C Jones MD.    Patient: Franklyn Madison  : 1950  Date of Visit: 2018    REASON FOR VISIT / CONSULTATION: Dizziness (HX:PAF,CHF Patient is here today because of his recent dizzy spells they are worsening when he gets up and stands too quickly noticed about a month now. He also has rapid and irregulare heart rates he is tired and having low BP Dr Henrry Bates D/C Lisinopril. He came to ER  for dyspnea left AMA Denies: CP)      Dear Cecilia Morales MD,     I had the pleasure of seeing your patient Franklyn Madison in follow up today. As you know, Mr. Dennise Solis is a 69 y.o. male with a history of a non ischemic dilated cardiomyopathy leading to placement of a Biotronic ICD with a single lead with atrial sensing ability. Unfortunately, he was found to have chronotropic incompetence since that time with moderate and persistent increased shortness of breath with any exertion leading to an upgrade of his ICD to a dual-chamber ICD with placement of a new atrial lead. He also has some dysautonomia which has been fairly well controlled recently. In  he had an AV fistula placed for dialysis and is currently being treated with dialysis 3x/week. In  he started getting more and more short episodes of atrial fibrillation seen on his intracardiac defibrillator (ICD). Atrial fibrillation burden in  was 2.5% with an average HR of 93 bpm.    Since the last time I saw him he reports not feeling very well. Although he says he feels light headed dizziness most of the time and is also tired at times. He is also having trouble with his restless legs and is keeping him up at night. He denied any current or recent chest pain, abdominal pain, bleeding problems, problems with his medications or any other concerns at this time.      Past Medical History:   Diagnosis Date    CAD (coronary artery disease)     Cancer ([facility] [address] 2013    left ear    Chronic combined systolic and diastolic CHF, NYHA class 3 ([facility])     EF 25-30%    CRF (chronic renal failure) / stage 4 4/23/2013    Dysautonomia orthostatic hypotension syndrome ([facility] [address] 5/12/2016    H/O echocardiogram 04/2016    EF25-30% Mildly increased LV wall thickness with a dialeted LV cavity size LA is mildly dialated 29-33 withLA volume index of 29ml/m2 Normal aortic valve structure with mod aortic regurg Normal mitral valve structure with od mitral regurg Mild Pulm htn est RV systolic pressure of 49BCWU calcification of tricuspid valve leaflets mild tricuspid regurg Mild dyastolic dysfunction seen    Hemodialysis patient ([facility] [address]     Tuesday, Thursday, Saturday -- [facility]    History of blood transfusion 2013    History of echocardiogram 6/6/13    LMCA: mild irreg 10-20%, LAD: mild irreg 10-20%, LCx: mild irreg 10-20%, RCA: mild irreg 10-20% The conus branch of the RCA had separate ostis off of the aorta, prly of little if any clinical significance.  ICD (implantable cardiac defibrillator), single, in situ 12/16/13    Biotronic    Mixed hyperlipidemia     Non-ischemic cardiomyopathy ([facility] [address] 6/10/2013    EF 25-30%     Restless leg syndrome     Systolic CHF, acute on chronic ([facility] [address] 4/23/2013       CURRENT ALLERGIES: Ancef [cefazolin] and Marcaine [bupivacaine hcl] REVIEW OF SYSTEMS: 10 systems were reviewed. Pertinent positives and negatives as above, all else negative.      Past Surgical History:   Procedure Laterality Date    CARDIAC CATHETERIZATION  6/6/2013    Dr. Alfredito Fatima  2013    DIALYSIS FISTULA CREATION  01/18/2017    Left Upper Arm- Dr. Morrissey Rank Right 11/22/2017    RUE per Dr. Morrissey Rank Right 11/22/2017    AV FISTULA CREATION REVISION-UPPER EXTREMITY performed by Long Velazquez DO at [facility], COLON, DIAGNOSTIC      EXCISION / BIOPSY SKIN LESION OF HEAD / NECK Left 6/19/2017    NECK LESION BIOPSY EXCISION, ALT <5 (L) 11/17/2016    AST 12 11/17/2016     11/17/2016    K 4.7 03/13/2018     11/17/2016    CREATININE 3.80 (H) 11/17/2016    BUN 51 (H) 11/17/2016    CO2 22 11/17/2016    TSH 1.54 03/11/2015    INR 2.4 10/05/2018     (H) 04/23/2013       ASSESSMENT:  1. Paroxysmal A-fib ([address]    2. Chronic combined systolic and diastolic CHF, NYHA class 3 ([facility])    3. Vasovagal syncope    4. Light-headedness       PLAN:  Paroxysmal Atrial Fibrillation: Rate Control  Beta Blocker: Continue metoprolol tartrate (Lopressor) 25 mg twice daily. I also discussed the potential side effects of this medication including lightheadedness and dizziness and told him to stop the medication of this occurs and call our office if this occurs. Stroke Risk: His CHADS2-VASc score is 3/9 (3.2% stroke risk)  Anticoagulation: Continue Warfain. Daily. Goal INR 2-3. I also reminded him to watch for signs of blood in his stool or black tarry stools and stop the medication immediately if this develops as this could be life threatening. Last monitored 9/28/18 showed he was in A-Fib 2.5% and mean value 93 bpm.  Lab Testing- CBC ordered to assess his HGB. Chronic Systolic and Diastolic Heart TPFXODB:VO:58-27%  Beta Blocker: Continue metoprolol succinate (Toprol XL) 25 mg once daily. ACE Inibitor/ARB: Continue lisinopril 2.5 mg once daily. Diuretics: Not indicated        Nonpharmacologic management of Heart Failure: I advised him to try and keep his legs up whenever possible and to limit salt in his diet. Lightheadedness/Dizziness  · Pharmacological Management: Not indicated at this time. May start something depending on the results of his tilt table testing. · Nonpharmacologic counseling: Because of his condition, I reminded him to try and keep himself well-hydrated and to take extra time when moving from laying to sitting, sitting to standing and standing to walking.  I also explained to him to help improve his symptoms

## 2018-10-08 ENCOUNTER — TELEPHONE (OUTPATIENT)
Dept: CARDIOLOGY | Age: 68
End: 2018-10-08

## 2018-10-08 DIAGNOSIS — R53.1 WEAKNESS: Primary | ICD-10-CM

## 2018-10-08 DIAGNOSIS — R26.89 BALANCE PROBLEMS: ICD-10-CM

## 2018-10-17 ENCOUNTER — HOSPITAL ENCOUNTER (OUTPATIENT)
Age: 68
Discharge: HOME | End: 2018-10-17
Payer: MEDICARE

## 2018-10-17 ENCOUNTER — HOSPITAL ENCOUNTER (OUTPATIENT)
Dept: NON INVASIVE DIAGNOSTICS | Age: 68
Discharge: HOME | End: 2018-10-17
Payer: MEDICARE

## 2018-10-17 ENCOUNTER — TELEPHONE (OUTPATIENT)
Dept: CARDIOLOGY | Age: 68
End: 2018-10-17

## 2018-10-17 ENCOUNTER — OFFICE VISIT (OUTPATIENT)
Dept: CARDIOLOGY | Age: 68
End: 2018-10-17
Payer: MEDICARE

## 2018-10-17 VITALS
DIASTOLIC BLOOD PRESSURE: 66 MMHG | RESPIRATION RATE: 16 BRPM | SYSTOLIC BLOOD PRESSURE: 100 MMHG | HEIGHT: 71 IN | BODY MASS INDEX: 23.3 KG/M2 | WEIGHT: 166.4 LBS | OXYGEN SATURATION: 97 % | HEART RATE: 118 BPM

## 2018-10-17 DIAGNOSIS — I50.42 CHRONIC COMBINED SYSTOLIC AND DIASTOLIC CHF, NYHA CLASS 3 (HCC): ICD-10-CM

## 2018-10-17 DIAGNOSIS — R55 VASOVAGAL SYNCOPE: ICD-10-CM

## 2018-10-17 DIAGNOSIS — I48.0 PAF (PAROXYSMAL ATRIAL FIBRILLATION) (HCC): Primary | ICD-10-CM

## 2018-10-17 DIAGNOSIS — R42 LIGHT-HEADEDNESS: ICD-10-CM

## 2018-10-17 DIAGNOSIS — Z79.01 LONG TERM CURRENT USE OF ANTICOAGULANT: ICD-10-CM

## 2018-10-17 DIAGNOSIS — I48.0 PAROXYSMAL A-FIB (HCC): ICD-10-CM

## 2018-10-17 DIAGNOSIS — R42 DIZZINESS: ICD-10-CM

## 2018-10-17 DIAGNOSIS — Z45.02 IMPLANTABLE DEFIBRILLATOR REPROGRAMMING/CHECK: ICD-10-CM

## 2018-10-17 DIAGNOSIS — R55 NEAR SYNCOPE: ICD-10-CM

## 2018-10-17 DIAGNOSIS — I42.8 NON-ISCHEMIC CARDIOMYOPATHY (HCC): ICD-10-CM

## 2018-10-17 LAB
HCT VFR BLD CALC: 36.4 % (ref 40.7–50.3)
HEMOGLOBIN: 11.9 G/DL (ref 13–17)
MCH RBC QN AUTO: 35.3 PG (ref 25.2–33.5)
MCHC RBC AUTO-ENTMCNC: 32.7 G/DL (ref 28.4–34.8)
MCV RBC AUTO: 108 FL (ref 82.6–102.9)
NRBC AUTOMATED: 0 PER 100 WBC
PDW BLD-RTO: 17 % (ref 11.8–14.4)
PLATELET # BLD: 212 K/UL (ref 138–453)
PMV BLD AUTO: 10.1 FL (ref 8.1–13.5)
RBC # BLD: 3.37 M/UL (ref 4.21–5.77)
WBC # BLD: 5.7 K/UL (ref 3.5–11.3)

## 2018-10-17 PROCEDURE — 1101F PT FALLS ASSESS-DOCD LE1/YR: CPT | Performed by: FAMILY MEDICINE

## 2018-10-17 PROCEDURE — 1123F ACP DISCUSS/DSCN MKR DOCD: CPT | Performed by: FAMILY MEDICINE

## 2018-10-17 PROCEDURE — 99214 OFFICE O/P EST MOD 30 MIN: CPT | Performed by: FAMILY MEDICINE

## 2018-10-17 PROCEDURE — 1036F TOBACCO NON-USER: CPT | Performed by: FAMILY MEDICINE

## 2018-10-17 PROCEDURE — 3017F COLORECTAL CA SCREEN DOC REV: CPT | Performed by: FAMILY MEDICINE

## 2018-10-17 PROCEDURE — G8484 FLU IMMUNIZE NO ADMIN: HCPCS | Performed by: FAMILY MEDICINE

## 2018-10-17 PROCEDURE — G8420 CALC BMI NORM PARAMETERS: HCPCS | Performed by: FAMILY MEDICINE

## 2018-10-17 PROCEDURE — 4040F PNEUMOC VAC/ADMIN/RCVD: CPT | Performed by: FAMILY MEDICINE

## 2018-10-17 PROCEDURE — 85027 COMPLETE CBC AUTOMATED: CPT

## 2018-10-17 PROCEDURE — G8427 DOCREV CUR MEDS BY ELIG CLIN: HCPCS | Performed by: FAMILY MEDICINE

## 2018-10-17 PROCEDURE — 93660 TILT TABLE EVALUATION: CPT

## 2018-10-17 PROCEDURE — 36415 COLL VENOUS BLD VENIPUNCTURE: CPT

## 2018-10-17 NOTE — PROGRESS NOTES
2 tab prior to dialysis and 1 tab during dialysis mid treatment (Tuesday Thursday and Saturday)) 90 tablet 3       FAMILY HISTORY: family history includes Heart Disease in his father. PHYSICAL EXAM:   BP 98/65 (Site: Left Upper Arm, Position: Sitting, Cuff Size: Large Adult)   Pulse 94   Resp 16   Ht 5' 11\" (1.803 m)   Wt 166 lb 6.4 oz (75.5 kg)   BMI 23.21 kg/m²  Body mass index is 23.21 kg/m². Constitutional: He is oriented to person, place, and time. He appears well-developed and well-nourished. In no acute distress. HEENT: Normocephalic and atraumatic. No JVD present. Carotid bruit is not present. No mass and no thyromegaly present. No lymphadenopathy present. Cardiovascular: Cardiovascular: Normal rate, irregularly irregular rhythm, normal heart sounds. Exam reveals no gallop and no friction rubs. A II/VI systolic was heard maximally at the apex without radiation. Pulmonary/Chest: Effort normal and breath sounds normal. No respiratory distress. He has no wheezes, rhonchi or rales. Abdominal: Soft, non-tender. Bowel sounds and aorta are normal. He exhibits no organomegaly, mass or bruit. Extremities: Trace lower extremity edema1/2 way up to the knees bilaterally. No cyanosis and no clubbing. Pulses are 2+ radial and carotid pulses. 2+ dorsalis pedis and posterior tibial pulses bilaterally. Neurological: He is alert and oriented to person, place, and time. No evidence of gross cranial nerve deficit. Coordination appeared normal.   Skin: Skin is warm and dry. There is no rash or diaphoresis. Psychiatric: He has a normal mood and affect.  His speech is normal and behavior is normal.      MOST RECENT LABS ON RECORD:   Lab Results   Component Value Date    WBC 10.7 11/17/2016    HGB 12.3 (L) 11/17/2016    HCT 38.1 (L) 11/17/2016     11/17/2016    CHOL 122 04/25/2016    TRIG 183 (H) 04/25/2016    HDL 33 (L) 04/25/2016    ALT <5 (L) 11/17/2016    AST 12 11/17/2016     11/17/2016    K otherwise told him to Return for 1 month follow up. However, I would be happy to see him sooner should the need arise. Once again, thank you for allowing me to participate in this patients care. Please do not hesitate to contact me could I be of further assistance. Sincerely,  Maris Vo. Jazmine BROWN, MS, F.A.C.C. [facility] Cardiology Specialist  [address] [place] [address] [facility], [address] [place] [address]  Phone: [phone], Fax: [phone]    I believe that the risk of significant morbidity and mortality related to the patient's current medical conditions are: intermediate-high. The documentation recorded by the scribe, accurately and completely reflects the services I personally performed and the decisions made by me. Luisito Luecro MD, MS, F.A.C.C.  October 17, 2018

## 2018-10-18 NOTE — PROCEDURES
certainly is not 541%, the relatively normal result  significantly decrease the likelihood of a neurocardiogenic source for  the patient's symptoms. However, if clinically suspicion remains  high, a repeat study may be indicated. STEPHANIE MCLAUGHLIN    D: 10/18/2018 12:20:22       T: 10/18/2018 12:21:27     EMILY/[email or web address]_KRANTHI  Job#: [id number]     Doc#: Unknown    CC:  Rehana Schuster

## 2018-10-19 ENCOUNTER — TELEPHONE (OUTPATIENT)
Dept: CARDIOLOGY | Age: 68
End: 2018-10-19

## 2018-10-26 ENCOUNTER — HOSPITAL ENCOUNTER (OUTPATIENT)
Dept: PHARMACY | Age: 68
Setting detail: THERAPIES SERIES
Discharge: HOME | End: 2018-10-26
Payer: MEDICARE

## 2018-10-26 ENCOUNTER — HOSPITAL ENCOUNTER (OUTPATIENT)
Dept: PHYSICAL THERAPY | Age: 68
Setting detail: THERAPIES SERIES
Discharge: HOME | End: 2018-10-26
Payer: MEDICARE

## 2018-10-26 VITALS — HEART RATE: 106 BPM | SYSTOLIC BLOOD PRESSURE: 101 MMHG | DIASTOLIC BLOOD PRESSURE: 62 MMHG

## 2018-10-26 DIAGNOSIS — Z79.01 LONG TERM CURRENT USE OF ANTICOAGULANT: ICD-10-CM

## 2018-10-26 DIAGNOSIS — I48.0 PAROXYSMAL A-FIB (HCC): ICD-10-CM

## 2018-10-26 LAB — INR BLD: 2.6

## 2018-10-26 PROCEDURE — G8979 MOBILITY GOAL STATUS: HCPCS

## 2018-10-26 PROCEDURE — G8978 MOBILITY CURRENT STATUS: HCPCS

## 2018-10-26 PROCEDURE — 99211 OFF/OP EST MAY X REQ PHY/QHP: CPT

## 2018-10-26 PROCEDURE — 97162 PT EVAL MOD COMPLEX 30 MIN: CPT

## 2018-10-26 PROCEDURE — 97110 THERAPEUTIC EXERCISES: CPT

## 2018-10-26 PROCEDURE — 85610 PROTHROMBIN TIME: CPT

## 2018-10-29 ENCOUNTER — HOSPITAL ENCOUNTER (OUTPATIENT)
Dept: PHYSICAL THERAPY | Age: 68
Setting detail: THERAPIES SERIES
Discharge: HOME | End: 2018-10-29
Payer: MEDICARE

## 2018-10-29 PROCEDURE — 97110 THERAPEUTIC EXERCISES: CPT

## 2018-10-31 ENCOUNTER — HOSPITAL ENCOUNTER (OUTPATIENT)
Dept: PHYSICAL THERAPY | Age: 68
Setting detail: THERAPIES SERIES
Discharge: HOME | End: 2018-10-31
Payer: MEDICARE

## 2018-10-31 PROCEDURE — 97110 THERAPEUTIC EXERCISES: CPT

## 2018-11-05 ENCOUNTER — HOSPITAL ENCOUNTER (OUTPATIENT)
Dept: PHYSICAL THERAPY | Age: 68
Setting detail: THERAPIES SERIES
Discharge: HOME | End: 2018-11-05
Payer: MEDICARE

## 2018-11-05 PROCEDURE — 97110 THERAPEUTIC EXERCISES: CPT

## 2018-11-05 NOTE — PROGRESS NOTES
met  [x]Not met      []Met  []Partially met  []Not met     Long Term Goals - Time Frame for Long term goals : 6 weeks  Long term goal 1: Pt will be independent and compliant with his HEP []Met  []Partially met  [x]Not met   Long term goal 2: Pt will have at least 4/5 strength throught zach LEs to improve stability with mobility. []Met  []Partially met  [x]Not met   Long term goal 3: Pt will present with good balance and be able to maintain a 30 sec sharpened rhomberg. []Met  []Partially met  [x]Not met   Long term goal 4: Pt endurance will improve to decrease noted SOB, pt will be able to tolerate a 45 min session with no more than 1 to 2 less than 1 to 2 minute recovery breaks.  []Met  []Partially met  [x]Not met     []Met  []Partially met  []Not met       Minutes Tracking:  Time In: 5267  Time Out: [address] [facility]  Minutes: [place], [place]     Date: 11/5/2018

## 2018-11-09 ENCOUNTER — HOSPITAL ENCOUNTER (OUTPATIENT)
Dept: PHYSICAL THERAPY | Age: 68
Setting detail: THERAPIES SERIES
Discharge: HOME | End: 2018-11-09
Payer: MEDICARE

## 2018-11-09 PROCEDURE — 97110 THERAPEUTIC EXERCISES: CPT

## 2018-11-14 ENCOUNTER — HOSPITAL ENCOUNTER (OUTPATIENT)
Dept: PHYSICAL THERAPY | Age: 68
Setting detail: THERAPIES SERIES
Discharge: HOME | End: 2018-11-14
Payer: MEDICARE

## 2018-11-14 PROCEDURE — 97110 THERAPEUTIC EXERCISES: CPT

## 2018-11-14 NOTE — PROGRESS NOTES
term goal 1: Initiate HEP and progress as tolerated -MET                                        []Met   []Partially met  []Not met   Short term goal 2: Pt will have at least 4-/5 strength grossly in zach LEs  []Met   []Partially met  []Not met   Short term goal 3: Pt will tolerate up to a 10 min walk without SOB. []Met   []Partially met  []Not met      []Met   []Partially met  []Not met     Long Term Goals - Time Frame for Long term goals : 6 weeks  Long term goal 1: Pt will be independent and compliant with his HEP []Met  []Partially met  []Not met   Long term goal 2: Pt will have at least 4/5 strength throught zach LEs to improve stability with mobility. []Met  []Partially met  []Not met   Long term goal 3: Pt will present with good balance and be able to maintain a 30 sec sharpened rhomberg. []Met  []Partially met  []Not met   Long term goal 4: Pt endurance will improve to decrease noted SOB, pt will be able to tolerate a 45 min session with no more than 1 to 2 less than 1 to 2 minute recovery breaks.  []Met  []Partially met  []Not met     []Met  []Partially met  []Not met       Minutes Tracking:  Time In: 8046  Time Out: 0930  Minutes: [address], [address] [facility], [facility]  Date: 11/14/2018

## 2018-11-16 ENCOUNTER — HOSPITAL ENCOUNTER (OUTPATIENT)
Dept: PHYSICAL THERAPY | Age: 68
Setting detail: THERAPIES SERIES
Discharge: HOME | End: 2018-11-16
Payer: MEDICARE

## 2018-11-16 PROCEDURE — 97110 THERAPEUTIC EXERCISES: CPT

## 2018-11-16 NOTE — PROGRESS NOTES
Phone: [facility]           Fax: [phone]                           Outpatient Physical Therapy                                                                            Daily Note    Patient: Julianna Ryan : [id number]  [facility] #: [id number]   Referring Practitioner:  Dr. Desmond Booth    Referral Date : 10/09/18     Date: 2018    Diagnosis: Weakness and balance  Treatment Diagnosis: difficulty walking    Onset Date: 18  PT Insurance Information: Medicare/Medicaid  Total # of Visits Approved: 18 Per Physician Order  Total # of Visits to Date: 7  No Show: 0  Canceled Appointment: 0      Pre-Treatment Pain:  0/10  Subjective: Pt reports he is \"just cold\" this morning. Pt states he is still having dizzy spells but they \"arent quite as bad\"     Exercises:  Exercise 1: **HEP sitting ex  Exercise 2: Sitting x10 ea; hip flex 3#, LAQ 3#, ball squeeze, knee flex with red T-band  Exercise 3: recum bike x10 min     Exercise 5: fwd/ retro amb at counter 2 laps ea  Exercise 6: FSU 15x ea 6 inch   Exercise 7: Sit to stands from high-low mat without UE 2x5  Exercise 8: Lucas taps (Low)) 10x ea  Exercise 9: StarTrac flex 2x15 4 pl           Assessment  Assessment: Pt with reported dizzy spells occuring during sit<>stand transfers causing greater LOB. Pt limited on progressions made d/t amount of rest breaks needed. Patient Education  Cont current HEP for endurance. Pt verbalized/demonstrated good understanding:     [x] Yes         [] No, pt required further clarification.     Post Treatment Pain:  0/10      Plan  Times per week: 2  Plan weeks: 6      Goals  (Total # of Visits to Date: 7)   Short Term Goals - Time Frame for Short term goals: 3 weeks     Short term goal 1: Initiate HEP and progress as tolerated -MET                                        [x]Met   []Partially met  []Not met   Short term goal 2: Pt will have at least 4-/5 strength grossly in zach LEs

## 2018-11-19 ENCOUNTER — OFFICE VISIT (OUTPATIENT)
Dept: CARDIOLOGY | Age: 68
End: 2018-11-19
Payer: MEDICARE

## 2018-11-19 VITALS
WEIGHT: 170 LBS | HEART RATE: 98 BPM | RESPIRATION RATE: 22 BRPM | BODY MASS INDEX: 23.8 KG/M2 | SYSTOLIC BLOOD PRESSURE: 91 MMHG | HEIGHT: 71 IN | OXYGEN SATURATION: 97 % | DIASTOLIC BLOOD PRESSURE: 61 MMHG

## 2018-11-19 DIAGNOSIS — R55 NEAR SYNCOPE: ICD-10-CM

## 2018-11-19 DIAGNOSIS — I48.0 PAF (PAROXYSMAL ATRIAL FIBRILLATION) (HCC): Primary | ICD-10-CM

## 2018-11-19 DIAGNOSIS — I50.42 CHRONIC COMBINED SYSTOLIC AND DIASTOLIC CONGESTIVE HEART FAILURE (HCC): ICD-10-CM

## 2018-11-19 DIAGNOSIS — I95.2 HYPOTENSION DUE TO DRUGS: ICD-10-CM

## 2018-11-19 PROCEDURE — 1036F TOBACCO NON-USER: CPT | Performed by: FAMILY MEDICINE

## 2018-11-19 PROCEDURE — 3017F COLORECTAL CA SCREEN DOC REV: CPT | Performed by: FAMILY MEDICINE

## 2018-11-19 PROCEDURE — 99214 OFFICE O/P EST MOD 30 MIN: CPT | Performed by: FAMILY MEDICINE

## 2018-11-19 PROCEDURE — G8420 CALC BMI NORM PARAMETERS: HCPCS | Performed by: FAMILY MEDICINE

## 2018-11-19 PROCEDURE — G8484 FLU IMMUNIZE NO ADMIN: HCPCS | Performed by: FAMILY MEDICINE

## 2018-11-19 PROCEDURE — 1101F PT FALLS ASSESS-DOCD LE1/YR: CPT | Performed by: FAMILY MEDICINE

## 2018-11-19 PROCEDURE — G8427 DOCREV CUR MEDS BY ELIG CLIN: HCPCS | Performed by: FAMILY MEDICINE

## 2018-11-19 PROCEDURE — 4040F PNEUMOC VAC/ADMIN/RCVD: CPT | Performed by: FAMILY MEDICINE

## 2018-11-19 PROCEDURE — 1123F ACP DISCUSS/DSCN MKR DOCD: CPT | Performed by: FAMILY MEDICINE

## 2018-11-19 RX ORDER — DIGOXIN 125 MCG
125 TABLET ORAL DAILY
Qty: 90 TABLET | Refills: 3 | Status: SHIPPED | OUTPATIENT
Start: 2018-11-19 | End: 2019-12-17 | Stop reason: SDUPTHER

## 2018-11-19 NOTE — PROGRESS NOTES
I, Rei Stockton am scribing for and in the presence of Alma Davis MD.    Patient: Mariann Lima  : 1950  Date of Visit: 2018    REASON FOR VISIT / CONSULTATION: Follow-up (HX: CHf, PAF, ICD, Non-Iscemic Cardiomyopathy, Near Syncope. C/O: SOB with mild exertion. Lighteaded/dizziness. Denies: CP, palpititations)      Dear Reynaldo Singleton MD,     I had the pleasure of seeing your patient Mariann Lima in follow up today. As you know, Mr. Hayde Small is a 77 y.o. male with a history of a non ischemic dilated cardiomyopathy leading to placement of a Biotronic ICD with a single lead with atrial sensing ability. Unfortunately, he was found to have chronotropic incompetence since that time with moderate and persistent increased shortness of breath with any exertion leading to an upgrade of his ICD to a dual-chamber ICD with placement of a new atrial lead. He also has some dysautonomia which has been fairly well controlled recently. In  he had an AV fistula placed for dialysis and is currently being treated with dialysis 3x/week. In  he started getting more and more short episodes of atrial fibrillation seen on his intracardiac defibrillator (ICD). Atrial fibrillation burden in  was 2.5% with an average HR of 93 bpm but has pretty much increased up to almost 100% since then. Since last visit, Mr. Hayde Small reports that he continues to feel fatigue and have intermittent moderate lightheaded and dizziness as well as moderate to severe generalized fatigue. This lightheadedness can happen while he is walking or bending over but can happen randomly. He said he keeps getting HR reading daily between . He is not sleeping well because his legs were jumping around due to being out of Requip. He says he cannot get a refill for 5 more days. He denied any current or recent chest pain, abdominal pain, bleeding problems, problems with his medications or any other concerns at this time. EXTREMITY performed by Rodney Foster DO at [address], COLON, DIAGNOSTIC      EXCISION / BIOPSY SKIN LESION OF HEAD / NECK Left 6/19/2017    NECK LESION BIOPSY EXCISION, BCC, FROZEN SECTION performed by Dustin Jarvis MD at [address] Right HIP    OTHER SURGICAL HISTORY  11/2016    Right chest port for dialysis    PACEMAKER PLACEMENT  2013    AICD    PRE-MALIGNANT / BENIGN SKIN LESION EXCISION Left 06/19/2017    Neck area    SKIN BIOPSY  9-    left axila    SKIN CANCER EXCISION Left 06/05/2015    Ear    TYMPANOPLASTY Left 05/12/2016    per Dr. Mortimer Grumbles History:  Social History   Substance Use Topics    Smoking status: Never Smoker    Smokeless tobacco: Never Used    Alcohol use 0.0 oz/week     3 - 4 Cans of beer per week      Comment: daily drinker BEER 3-4        CURRENT MEDICATIONS:  Outpatient Prescriptions Marked as Taking for the 11/19/18 encounter (Office Visit) with Nelli Rodriguez MD   Medication Sig Dispense Refill    FLUDROCORTISONE ACETATE PO Take by mouth      gabapentin (NEURONTIN) 100 MG capsule Take 1 capsule by mouth 2 times daily as needed (numbness) for up to 30 days. . 60 capsule 5    rOPINIRole (REQUIP) 1 MG tablet Take 1 tablet by mouth nightly 30 tablet 1    metoprolol tartrate (LOPRESSOR) 25 MG tablet TAKE 1 TABLET BY MOUTH 2 TIMES DAILY 180 tablet 3    carbidopa-levodopa (SINEMET)  MG per tablet Take 1 tablet by mouth in the morning and take 2 take tablets in the evening 90 tablet 1    Handicap Placard MISC by Does not apply route Duration; 4 years 1 each 0    furosemide (LASIX) 80 MG tablet TAKE 1 TABLET BY MOUTH TWICE A DAY  3    warfarin (COUMADIN) 5 MG tablet Take 1 tablet by mouth daily (Patient taking differently: Take 5 mg by mouth daily Takes 2.5mg everyday except Wednesday takes 5mg once daily) 90 tablet 3    sodium bicarbonate 650 MG tablet TAKE 1 TABLET BY MOUTH EVERY DAY  6    allopurinol (ZYLOPRIM) 100 MG tablet Take 1 tablet by mouth daily 30 tablet 5    midodrine (PROAMATINE) 5 MG tablet Take 1 tablet by mouth 2 times daily (with meals) (Patient taking differently: Take 5 mg by mouth daily Take 2 tab prior to dialysis and 1 tab during dialysis mid treatment (Tuesday Thursday and Saturday)) 90 tablet 3       FAMILY HISTORY: family history includes Heart Disease in his father. PHYSICAL EXAM:   BP 91/61 (Site: Left Upper Arm, Position: Sitting, Cuff Size: Medium Adult)   Pulse 98   Resp 22   Ht 5' 11\" (1.803 m)   Wt 170 lb (77.1 kg)   SpO2 97%   BMI 23.71 kg/m²  Body mass index is 23.71 kg/m². Constitutional: He is oriented to person, place, and time. He appears well-developed and well-nourished. In no acute distress. HEENT: Normocephalic and atraumatic. No JVD present. Carotid bruit is not present. No mass and no thyromegaly present. No lymphadenopathy present. Cardiovascular: Cardiovascular: Tachycardic rate, irregularly irregular rhythm, normal heart sounds. Exam reveals no gallop and no friction rubs. A II/VI systolic was heard maximally at the apex without radiation. Pulmonary/Chest: Effort normal and breath sounds normal. No respiratory distress. He has no wheezes, rhonchi or rales. Abdominal: Soft, non-tender. Bowel sounds and aorta are normal. He exhibits no organomegaly, mass or bruit. Extremities: No edema. No cyanosis and no clubbing. Pulses are 2+ radial and carotid pulses. 2+ dorsalis pedis and posterior tibial pulses bilaterally. Neurological: He is alert and oriented to person, place, and time. No evidence of gross cranial nerve deficit. Coordination appeared normal.   Skin: Skin is warm and dry. There is no rash or diaphoresis. Psychiatric: He has a normal mood and affect.  His speech is normal and behavior is normal.      MOST RECENT LABS ON RECORD:   Lab Results   Component Value Date    WBC 5.7 10/17/2018    HGB 11.9 (L) 10/17/2018    HCT 36.4 (L) 10/17/2018     10/17/2018    CHOL 122 04/25/2016    TRIG 183 (H) 04/25/2016    HDL 33 (L) 04/25/2016    ALT <5 (L) 11/17/2016    AST 12 11/17/2016     11/17/2016    K 4.7 03/13/2018     11/17/2016    CREATININE 3.80 (H) 11/17/2016    BUN 51 (H) 11/17/2016    CO2 22 11/17/2016    TSH 1.54 03/11/2015    INR 2.6 10/26/2018     (H) 04/23/2013       ASSESSMENT:  1. PAF (paroxysmal atrial fibrillation) ([facility] [address]    2. Near syncope    3. Chronic combined systolic and diastolic congestive heart failure ([facility] [address]    4. Hypotension due to drugs       PLAN:  Paroxymal Atrial Fibrillation: Rate Control but HR continues to be high. Beta Blocker: Continue metoprolol tartrate (Lopressor) 12.5 mg twice daily. Add Digoxin 0.125 mg daily with Digoxin level in 1 week. Stroke Risk: His CHADS2-VASc score is 3/9 (3.2% stroke risk)  Anticoagulation: Continue Warfain. Daily. Goal INR 2-3. I also reminded him to watch for signs of blood in his stool or black tarry stools and stop the medication immediately if this develops as this could be life threatening. Tachycardia: Symptomatic  · Beta Blocker: Continue metoprolol tartrate (Lopressor) 25 mg twice daily. · Calcium Channel Blocker: Not indicated       · Additional Testing: None    Near Syncope: Lightheadedness/Dizziness: May be related to his tachycardia/paroxysmal atrial fibrillation   · Pharmacological Management: Continue Midodrine. Stop Lasix. Says only makes about a cup of urine and this may be contributing to patients symptoms and tachycardia. I have a call out to Dr. Harrell Fail to discuss this evening. · Start Digoxin as above. · Nonpharmacologic counseling: Because of his condition, I reminded him to try and keep himself well-hydrated and to take extra time when moving from laying to sitting, sitting to standing and standing to walking.      Chronic Systolic and Diastolic Heart DUNQIIV:UO:03-30%: Currently appears well controlled  Beta Blocker: Continue metoprolol tartrate (Lopressor) 12.5 mg twice daily. ACE Inibitor/ARB: Continue lisinopril 2.5 mg once daily. Diuretics: Stop furosemide (lasix)    Start Digoxin 125 mcg once daily  Nonpharmacologic management of Heart Failure: I advised him to try and keep his legs up whenever possible and to limit salt in his diet. Implantable Cardioverter Defibrillator (ICD): Indication for Device Placement: Non Ischemic Cardiomyopathy (EF<35%)  Interrogation Findings: I looked at his last monitor readings. Continues to run fast in chronic atrial fibrillation. Digoxin as above. Follow Up: Because he also has a defibrillator, we will also plan to continue to monitor this remotely from home every 3 months and in the office 1x/year. Finally, I recommended that he continue his other medications and follow up with you as previously scheduled. FOLLOW UP:   I told Mr. Amber Gray to call my office if he had any problems, but otherwise told him to Return in about 4 weeks (around 12/17/2018). However, I would be happy to see him sooner should the need arise. Sincerely,  Gilford Russian. Bruhl MD, MS, F.A.C.C. [facility] Cardiology Specialist  [address]  Phone: [phone], Fax: [phone]    I believe that the risk of significant morbidity and mortality related to the patient's current medical conditions are: intermediate-high. The documentation recorded by the scribe, accurately and completely reflects the services I personally performed and the decisions made by me. Luisito Lucero MD, MS, F.A.C.C.  November 19, 2018

## 2018-11-20 ENCOUNTER — TELEPHONE (OUTPATIENT)
Dept: CARDIOLOGY | Age: 68
End: 2018-11-20

## 2018-11-21 ENCOUNTER — HOSPITAL ENCOUNTER (OUTPATIENT)
Dept: PHYSICAL THERAPY | Age: 68
Setting detail: THERAPIES SERIES
Discharge: HOME | End: 2018-11-21
Payer: MEDICARE

## 2018-11-21 ENCOUNTER — HOSPITAL ENCOUNTER (OUTPATIENT)
Dept: PHARMACY | Age: 68
Setting detail: THERAPIES SERIES
Discharge: HOME | End: 2018-11-21
Payer: MEDICARE

## 2018-11-21 VITALS — HEART RATE: 96 BPM | DIASTOLIC BLOOD PRESSURE: 55 MMHG | SYSTOLIC BLOOD PRESSURE: 95 MMHG

## 2018-11-21 DIAGNOSIS — I48.0 PAROXYSMAL A-FIB (HCC): ICD-10-CM

## 2018-11-21 DIAGNOSIS — Z79.01 LONG TERM CURRENT USE OF ANTICOAGULANT: ICD-10-CM

## 2018-11-21 LAB — INR BLD: 2.3

## 2018-11-21 PROCEDURE — G8979 MOBILITY GOAL STATUS: HCPCS

## 2018-11-21 PROCEDURE — 97110 THERAPEUTIC EXERCISES: CPT

## 2018-11-21 PROCEDURE — G8978 MOBILITY CURRENT STATUS: HCPCS

## 2018-11-21 PROCEDURE — 99211 OFF/OP EST MAY X REQ PHY/QHP: CPT

## 2018-11-21 PROCEDURE — 85610 PROTHROMBIN TIME: CPT

## 2018-11-26 ENCOUNTER — HOSPITAL ENCOUNTER (OUTPATIENT)
Dept: PHYSICAL THERAPY | Age: 68
Setting detail: THERAPIES SERIES
Discharge: HOME | End: 2018-11-26
Payer: MEDICARE

## 2018-11-26 PROCEDURE — 97110 THERAPEUTIC EXERCISES: CPT

## 2018-11-26 NOTE — PROGRESS NOTES
up to a 10 min walk without SOB. - progressing  []Met   [x]Partially met  []Not met      []Met   []Partially met  []Not met     Long Term Goals - Time Frame for Long term goals : 6 weeks  Long term goal 1: Pt will be independent and compliant with his HEP []Met  []Partially met  [x]Not met   Long term goal 2: Pt will have at least 4/5 strength throught zach LEs to improve stability with mobility. []Met  []Partially met  [x]Not met   Long term goal 3: Pt will present with good balance and be able to maintain a 30 sec sharpened rhomberg. []Met  []Partially met  [x]Not met   Long term goal 4: Pt endurance will improve to decrease noted SOB, pt will be able to tolerate a 45 min session with no more than 1 to 2 less than 1 to 2 minute recovery breaks.  []Met  []Partially met  [x]Not met     []Met  []Partially met  []Not met       Minutes Tracking:  Time In: [facility]  Time Out: 21   Minutes: [place], [place]     Date: 11/26/2018

## 2018-11-28 ENCOUNTER — HOSPITAL ENCOUNTER (OUTPATIENT)
Dept: PHYSICAL THERAPY | Age: 68
Setting detail: THERAPIES SERIES
Discharge: HOME | End: 2018-11-28
Payer: MEDICARE

## 2018-11-28 PROCEDURE — 97110 THERAPEUTIC EXERCISES: CPT

## 2018-11-29 ENCOUNTER — TELEPHONE (OUTPATIENT)
Dept: CARDIOLOGY | Age: 68
End: 2018-11-29

## 2018-11-29 NOTE — TELEPHONE ENCOUNTER
Catherine at dialysis called and is worried about pt's HR before TX was 125 while on tx it settles down and then once that's done it does back up around  Nephrologist started midodrine 10 mg BID today please advise    Thank you  Sha Keene

## 2018-11-30 NOTE — TELEPHONE ENCOUNTER
Please them know that I am worried about that as well. He needs to get his Digoxin level ASAP. It was ordered about 10 days ago. We probably need to go up on that.

## 2018-12-03 ENCOUNTER — HOSPITAL ENCOUNTER (OUTPATIENT)
Dept: PHYSICAL THERAPY | Age: 68
Setting detail: THERAPIES SERIES
Discharge: HOME | End: 2018-12-03
Payer: MEDICARE

## 2018-12-03 PROCEDURE — G8980 MOBILITY D/C STATUS: HCPCS

## 2018-12-03 PROCEDURE — 97110 THERAPEUTIC EXERCISES: CPT

## 2018-12-03 PROCEDURE — G8979 MOBILITY GOAL STATUS: HCPCS

## 2018-12-03 NOTE — DISCHARGE SUMMARY
Phone: [facility]          Fax: [phone]                            Outpatient Physical Therapy                                                                    Discharge Summary    Patient: Rolanda Mathew  :   CSN #: [id number]   Referring physician: No admitting provider for patient encounter. Referring Practitioner: Dr. Kandice Jerry      Diagnosis: Weakness and balance      Date Treatment Initiated: 10/26/18  Date of Last Treatment: 12/3/18      PT Visit Information  Onset Date: 18  PT Insurance Information: Medicare/Medicaid  Total # of Visits Approved: 18  Total # of Visits to Date: 11  Plan of Care/Certification Expiration Date: 18  No Show: 0  Canceled Appointment: 0      Frequency/Duration   2 times per week   6 weeks      Treatment Received  []HP/CP      []Electrical Stim   [x]Therapeutic Exercise      [x]Gait Training  []Aquatics   []Ultrasound         [x]Patient Education/HEP   []Manual Therapy  []Traction    [x]Neuro-charla        []Soft Tissue Mobs            []Home TENS  []Iontophoresis    []Orthotic casting/fitting      []Dry Needling    Assessment  Assessment: Pt has completed 11 PT visits to date. Current LE MMT: hip flex 4-/5, ABD/ADD 4/5, knee flex/ext 4/5. Pt able to maintain balance with feet together and eyes open with good balance and min sway. Pt demonstrates increased sway with partial tandem stance with eyes open but able to maintain balance; however, with eyes closed, pt unable to maintain balance with partial tandem stance for > 3 seconds. Pt cont to demo decreased endurance, requesting frequent seated rest breaks throughout treatment. Pt unable to perform 10min walk without rest break d/t fatigue. Pt states dizziness and unsteadiness has been up and down with medication changes. Pt denies improvement with physical therapy interventions and is wishing to be D/C from PT at this time.         [x] Primary Impairment :      G

## 2018-12-03 NOTE — PROGRESS NOTES
interventions and is wishing to be D/C from PT at this time. Patient Education  Educated on plan for D/C and following up with doctor for further assessment. Pt verbalized/demonstrated good understanding:     [x] Yes         [] No, pt required further clarification. Post Treatment Pain:  0/10      Plan  Times per week: 2  Plan weeks: 6      Goals  (Total # of Visits to Date: 6)   Short Term Goals - Time Frame for Short term goals: 3 weeks     Short term goal 1: Initiate HEP and progress as tolerated -MET                                        []Met   []Partially met  []Not met   Short term goal 2: Pt will have at least 4-/5 strength grossly in zach LEs - met  []Met   []Partially met  []Not met   Short term goal 3: Pt will tolerate up to a 10 min walk without SOB. - not met  []Met   []Partially met  []Not met      []Met   []Partially met  []Not met     Long Term Goals - Time Frame for Long term goals : 6 weeks  Long term goal 1: Pt will be independent and compliant with his HEP- not met []Met  []Partially met  []Not met   Long term goal 2: Pt will have at least 4/5 strength throught zach LEs to improve stability with mobility. - partly met []Met  []Partially met  []Not met   Long term goal 3: Pt will present with good balance and be able to maintain a 30 sec sharpened rhomberg. - not met []Met  []Partially met  []Not met   Long term goal 4: Pt endurance will improve to decrease noted SOB, pt will be able to tolerate a 45 min session with no more than 1 to 2 less than 1 to 2 minute recovery breaks. - not met []Met  []Partially met  []Not met     []Met  []Partially met  []Not met       Minutes Tracking:  Time In: [address]  Time Out: [address]  Minutes: Nolberto Cole, DPT  Date: 12/3/2018

## 2018-12-04 ENCOUNTER — HOSPITAL ENCOUNTER (OUTPATIENT)
Age: 68
Setting detail: SPECIMEN
Discharge: HOME | End: 2018-12-04
Payer: MEDICARE

## 2018-12-04 ENCOUNTER — TELEPHONE (OUTPATIENT)
Dept: CARDIOLOGY | Age: 68
End: 2018-12-04

## 2018-12-04 DIAGNOSIS — R55 NEAR SYNCOPE: ICD-10-CM

## 2018-12-04 DIAGNOSIS — I48.0 PAF (PAROXYSMAL ATRIAL FIBRILLATION) (HCC): ICD-10-CM

## 2018-12-04 DIAGNOSIS — I50.42 CHRONIC COMBINED SYSTOLIC AND DIASTOLIC CONGESTIVE HEART FAILURE (HCC): ICD-10-CM

## 2018-12-04 LAB
DIGOXIN DATE LAST DOSE: NORMAL
DIGOXIN DOSE AMOUNT: NORMAL
DIGOXIN DOSE TIME: NORMAL
DIGOXIN LEVEL: 1.5 NG/ML (ref 0.5–2)

## 2018-12-04 PROCEDURE — 80162 ASSAY OF DIGOXIN TOTAL: CPT

## 2018-12-12 ENCOUNTER — OFFICE VISIT (OUTPATIENT)
Dept: CARDIOLOGY | Age: 68
End: 2018-12-12
Payer: MEDICARE

## 2018-12-12 VITALS
RESPIRATION RATE: 16 BRPM | WEIGHT: 168.8 LBS | SYSTOLIC BLOOD PRESSURE: 100 MMHG | BODY MASS INDEX: 23.63 KG/M2 | HEART RATE: 70 BPM | OXYGEN SATURATION: 97 % | HEIGHT: 71 IN | DIASTOLIC BLOOD PRESSURE: 63 MMHG

## 2018-12-12 DIAGNOSIS — Z51.81 ENCOUNTER FOR MONITORING DIGOXIN THERAPY: ICD-10-CM

## 2018-12-12 DIAGNOSIS — I48.0 PAROXYSMAL A-FIB (HCC): Primary | ICD-10-CM

## 2018-12-12 DIAGNOSIS — R53.1 WEAKNESS: ICD-10-CM

## 2018-12-12 DIAGNOSIS — R06.02 SOB (SHORTNESS OF BREATH): ICD-10-CM

## 2018-12-12 DIAGNOSIS — R26.81 UNSTEADY GAIT: ICD-10-CM

## 2018-12-12 DIAGNOSIS — I50.42 CHRONIC COMBINED SYSTOLIC AND DIASTOLIC CHF, NYHA CLASS 3 (HCC): ICD-10-CM

## 2018-12-12 DIAGNOSIS — Z79.899 ENCOUNTER FOR MONITORING DIGOXIN THERAPY: ICD-10-CM

## 2018-12-12 PROCEDURE — G8420 CALC BMI NORM PARAMETERS: HCPCS | Performed by: FAMILY MEDICINE

## 2018-12-12 PROCEDURE — 1123F ACP DISCUSS/DSCN MKR DOCD: CPT | Performed by: FAMILY MEDICINE

## 2018-12-12 PROCEDURE — 1101F PT FALLS ASSESS-DOCD LE1/YR: CPT | Performed by: FAMILY MEDICINE

## 2018-12-12 PROCEDURE — 4040F PNEUMOC VAC/ADMIN/RCVD: CPT | Performed by: FAMILY MEDICINE

## 2018-12-12 PROCEDURE — 3017F COLORECTAL CA SCREEN DOC REV: CPT | Performed by: FAMILY MEDICINE

## 2018-12-12 PROCEDURE — G8427 DOCREV CUR MEDS BY ELIG CLIN: HCPCS | Performed by: FAMILY MEDICINE

## 2018-12-12 PROCEDURE — 99214 OFFICE O/P EST MOD 30 MIN: CPT | Performed by: FAMILY MEDICINE

## 2018-12-12 PROCEDURE — 1036F TOBACCO NON-USER: CPT | Performed by: FAMILY MEDICINE

## 2018-12-12 PROCEDURE — G8484 FLU IMMUNIZE NO ADMIN: HCPCS | Performed by: FAMILY MEDICINE

## 2018-12-12 RX ORDER — FUROSEMIDE 80 MG
80 TABLET ORAL 2 TIMES DAILY
COMMUNITY

## 2018-12-12 NOTE — PROGRESS NOTES
He denied any current or recent chest pain, abdominal pain, bleeding problems, problems with his medications or any other concerns at this time. Past Medical History:   Diagnosis Date    CAD (coronary artery disease)     Cancer ([facility] 2013    left ear    Chronic combined systolic and diastolic CHF, NYHA class 3 (HCC)     EF 25-30%    CRF (chronic renal failure) / stage 4 4/23/2013    Dysautonomia orthostatic hypotension syndrome ([facility] 5/12/2016    H/O echocardiogram 04/2016    EF25-30% Mildly increased LV wall thickness with a dialeted LV cavity size LA is mildly dialated 29-33 withLA volume index of 29ml/m2 Normal aortic valve structure with mod aortic regurg Normal mitral valve structure with od mitral regurg Mild Pulm htn est RV systolic pressure of 19NGJL calcification of tricuspid valve leaflets mild tricuspid regurg Mild dyastolic dysfunction seen    Hemodialysis patient ([facility]     Tuesday, Thursday, Saturday -- Cordell Farooq Kahn [address]    History of blood transfusion 2013    History of echocardiogram 6/6/13    LMCA: mild irreg 10-20%, LAD: mild irreg 10-20%, LCx: mild irreg 10-20%, RCA: mild irreg 10-20% The conus branch of the RCA had separate ostis off of the aorta, prly of little if any clinical significance.  History of tilt table evaluation 10/17/2018    Negative.  ICD (implantable cardiac defibrillator), single, in situ 12/16/13    Biotronic    Mixed hyperlipidemia     Non-ischemic cardiomyopathy ([facility] 6/10/2013    EF 25-30%     Restless leg syndrome     Systolic CHF, acute on chronic ([facility] 4/23/2013       CURRENT ALLERGIES: Ancef [cefazolin] and Marcaine [bupivacaine hcl] REVIEW OF SYSTEMS: 14 systems were reviewed. Pertinent positives and negatives as above, all else negative.      Past Surgical History:   Procedure Laterality Date    CARDIAC CATHETERIZATION  6/6/2013    Dr. Harlan Huerta  2013    DIALYSIS FISTULA CREATION  01/18/2017    Left Upper Arm- Dr. Inez Gonzalez

## 2018-12-28 PROCEDURE — 93296 REM INTERROG EVL PM/IDS: CPT | Performed by: FAMILY MEDICINE

## 2018-12-28 PROCEDURE — 93295 DEV INTERROG REMOTE 1/2/MLT: CPT | Performed by: FAMILY MEDICINE

## 2018-12-31 ENCOUNTER — TELEPHONE (OUTPATIENT)
Dept: CARDIOLOGY | Age: 68
End: 2018-12-31

## 2018-12-31 ENCOUNTER — NURSE ONLY (OUTPATIENT)
Dept: CARDIOLOGY | Age: 68
End: 2018-12-31
Payer: MEDICARE

## 2018-12-31 ENCOUNTER — HOSPITAL ENCOUNTER (OUTPATIENT)
Dept: PHARMACY | Age: 68
Setting detail: THERAPIES SERIES
End: 2018-12-31
Payer: MEDICARE

## 2018-12-31 DIAGNOSIS — I42.8 NON-ISCHEMIC CARDIOMYOPATHY (HCC): ICD-10-CM

## 2018-12-31 DIAGNOSIS — Z95.810 ICD (IMPLANTABLE CARDIOVERTER-DEFIBRILLATOR) IN PLACE: Primary | ICD-10-CM

## 2019-01-03 ENCOUNTER — HOSPITAL ENCOUNTER (OUTPATIENT)
Dept: PHARMACY | Age: 69
Setting detail: THERAPIES SERIES
Discharge: HOME | End: 2019-01-03
Payer: MEDICARE

## 2019-01-03 VITALS — HEART RATE: 88 BPM | DIASTOLIC BLOOD PRESSURE: 68 MMHG | SYSTOLIC BLOOD PRESSURE: 113 MMHG

## 2019-01-03 DIAGNOSIS — I48.0 PAROXYSMAL A-FIB (HCC): ICD-10-CM

## 2019-01-03 DIAGNOSIS — Z79.01 LONG TERM CURRENT USE OF ANTICOAGULANT: ICD-10-CM

## 2019-01-03 LAB — INR BLD: 2.7

## 2019-01-03 PROCEDURE — 99211 OFF/OP EST MAY X REQ PHY/QHP: CPT

## 2019-01-03 PROCEDURE — 85610 PROTHROMBIN TIME: CPT

## 2019-02-13 ENCOUNTER — HOSPITAL ENCOUNTER (OUTPATIENT)
Dept: PHARMACY | Age: 69
Setting detail: THERAPIES SERIES
Discharge: HOME | End: 2019-02-13
Payer: MEDICARE

## 2019-02-13 VITALS
DIASTOLIC BLOOD PRESSURE: 55 MMHG | SYSTOLIC BLOOD PRESSURE: 100 MMHG | HEART RATE: 73 BPM | BODY MASS INDEX: 23.74 KG/M2 | WEIGHT: 170.2 LBS

## 2019-02-13 DIAGNOSIS — Z79.01 LONG TERM CURRENT USE OF ANTICOAGULANT: ICD-10-CM

## 2019-02-13 DIAGNOSIS — I48.0 PAROXYSMAL A-FIB (HCC): ICD-10-CM

## 2019-02-13 LAB — INR BLD: 2.3

## 2019-02-13 PROCEDURE — 99211 OFF/OP EST MAY X REQ PHY/QHP: CPT | Performed by: FAMILY MEDICINE

## 2019-02-13 PROCEDURE — 85610 PROTHROMBIN TIME: CPT | Performed by: FAMILY MEDICINE

## 2019-02-13 RX ORDER — FLUDROCORTISONE ACETATE 0.1 MG/1
0.1 TABLET ORAL DAILY
COMMUNITY

## 2019-03-13 PROBLEM — Z99.2 ESRD (END STAGE RENAL DISEASE) ON DIALYSIS (HCC): Status: ACTIVE | Noted: 2019-03-13

## 2019-03-13 PROBLEM — N18.6 ESRD (END STAGE RENAL DISEASE) ON DIALYSIS (HCC): Status: ACTIVE | Noted: 2019-03-13

## 2019-03-27 ENCOUNTER — HOSPITAL ENCOUNTER (OUTPATIENT)
Dept: PHARMACY | Age: 69
Setting detail: THERAPIES SERIES
Discharge: HOME | End: 2019-03-27
Payer: MEDICARE

## 2019-03-27 VITALS
BODY MASS INDEX: 23.43 KG/M2 | SYSTOLIC BLOOD PRESSURE: 104 MMHG | WEIGHT: 168 LBS | HEART RATE: 91 BPM | DIASTOLIC BLOOD PRESSURE: 66 MMHG

## 2019-03-27 DIAGNOSIS — Z79.01 LONG TERM CURRENT USE OF ANTICOAGULANT: ICD-10-CM

## 2019-03-27 DIAGNOSIS — I48.0 PAROXYSMAL A-FIB (HCC): ICD-10-CM

## 2019-03-27 LAB — INR BLD: 2.1

## 2019-03-27 PROCEDURE — 85610 PROTHROMBIN TIME: CPT

## 2019-03-27 PROCEDURE — 99211 OFF/OP EST MAY X REQ PHY/QHP: CPT

## 2019-04-01 ENCOUNTER — OFFICE VISIT (OUTPATIENT)
Dept: CARDIOLOGY | Age: 69
End: 2019-04-01
Payer: MEDICARE

## 2019-04-01 VITALS
HEIGHT: 71 IN | SYSTOLIC BLOOD PRESSURE: 98 MMHG | BODY MASS INDEX: 23.8 KG/M2 | WEIGHT: 170 LBS | OXYGEN SATURATION: 94 % | DIASTOLIC BLOOD PRESSURE: 61 MMHG | RESPIRATION RATE: 20 BRPM | HEART RATE: 67 BPM

## 2019-04-01 DIAGNOSIS — I50.42 CHRONIC COMBINED SYSTOLIC AND DIASTOLIC CHF, NYHA CLASS 3 (HCC): ICD-10-CM

## 2019-04-01 DIAGNOSIS — R06.02 SOB (SHORTNESS OF BREATH): ICD-10-CM

## 2019-04-01 DIAGNOSIS — R42 LIGHTHEADED: ICD-10-CM

## 2019-04-01 DIAGNOSIS — G25.81 RESTLESS LEGS: ICD-10-CM

## 2019-04-01 DIAGNOSIS — I48.0 PAROXYSMAL A-FIB (HCC): Primary | ICD-10-CM

## 2019-04-01 PROCEDURE — 1123F ACP DISCUSS/DSCN MKR DOCD: CPT | Performed by: FAMILY MEDICINE

## 2019-04-01 PROCEDURE — G8427 DOCREV CUR MEDS BY ELIG CLIN: HCPCS | Performed by: FAMILY MEDICINE

## 2019-04-01 PROCEDURE — 1036F TOBACCO NON-USER: CPT | Performed by: FAMILY MEDICINE

## 2019-04-01 PROCEDURE — 99213 OFFICE O/P EST LOW 20 MIN: CPT | Performed by: FAMILY MEDICINE

## 2019-04-01 PROCEDURE — 3017F COLORECTAL CA SCREEN DOC REV: CPT | Performed by: FAMILY MEDICINE

## 2019-04-01 PROCEDURE — 4040F PNEUMOC VAC/ADMIN/RCVD: CPT | Performed by: FAMILY MEDICINE

## 2019-04-01 PROCEDURE — G8420 CALC BMI NORM PARAMETERS: HCPCS | Performed by: FAMILY MEDICINE

## 2019-04-01 NOTE — PROGRESS NOTES
(chronic renal failure) / stage 4 4/23/2013    Dysautonomia orthostatic hypotension syndrome ([facility] 5/12/2016    H/O echocardiogram 04/2016    EF25-30% Mildly increased LV wall thickness with a dialeted LV cavity size LA is mildly dialated 29-33 withLA volume index of 29ml/m2 Normal aortic valve structure with mod aortic regurg Normal mitral valve structure with od mitral regurg Mild Pulm htn est RV systolic pressure of 23XIKB calcification of tricuspid valve leaflets mild tricuspid regurg Mild dyastolic dysfunction seen    Hemodialysis patient ([facility]     Tuesday, Thursday, Saturday -- Cordellyadira Dexterfrank Kahn [address]    History of blood transfusion 2013    History of echocardiogram 6/6/13    LMCA: mild irreg 10-20%, LAD: mild irreg 10-20%, LCx: mild irreg 10-20%, RCA: mild irreg 10-20% The conus branch of the RCA had separate ostis off of the aorta, prly of little if any clinical significance.  History of tilt table evaluation 10/17/2018    Negative.  ICD (implantable cardiac defibrillator), single, in situ 12/16/13    Biotronic    Mixed hyperlipidemia     Non-ischemic cardiomyopathy ([facility] 6/10/2013    EF 25-30%     Restless leg syndrome     Systolic CHF, acute on chronic ([facility] 4/23/2013       CURRENT ALLERGIES: Ancef [cefazolin] and Marcaine [bupivacaine hcl] REVIEW OF SYSTEMS: 14 systems were reviewed. Pertinent positives and negatives as above, all else negative.      Past Surgical History:   Procedure Laterality Date    CARDIAC CATHETERIZATION  6/6/2013    Dr. Hola Jackson  2013    DIALYSIS FISTULA CREATION  01/18/2017    Left Upper Arm- Dr. Mena Mcbride Right 11/22/2017    RUE per Dr. Mena Mcbride Right 11/22/2017    AV FISTULA CREATION REVISION-UPPER EXTREMITY performed by Natalee Palumbo DO at [address], COLON, DIAGNOSTIC      EXCISION / BIOPSY SKIN LESION OF HEAD / NECK Left 6/19/2017    NECK LESION BIOPSY EXCISION, BCC, FROZEN SECTION performed by Laila Kinsey MD at [facility] Right HIP    OTHER SURGICAL HISTORY  11/2016    Right chest port for dialysis    PACEMAKER PLACEMENT  2013    AICD    PRE-MALIGNANT / BENIGN SKIN LESION EXCISION Left 06/19/2017    Neck area    SKIN BIOPSY  9-    left axila    SKIN CANCER EXCISION Left 06/05/2015    Ear    TYMPANOPLASTY Left 05/12/2016    per Dr. Hamlet Sharma    Social History:  Social History     Tobacco Use    Smoking status: Never Smoker    Smokeless tobacco: Never Used   Substance Use Topics    Alcohol use: Yes     Alcohol/week: 0.0 oz     Types: 3 - 4 Cans of beer per week     Comment: daily drinker BEER 3-4    Drug use: Yes     Comment: Remote years ago        CURRENT MEDICATIONS:  Outpatient Medications Marked as Taking for the 4/1/19 encounter (Office Visit) with Eliud Burton MD   Medication Sig Dispense Refill    carbidopa-levodopa (SINEMET)  MG per tablet TAKE 1 TABLET BY MOUTH IN THE MORNING AND TAKE 2 TAKE TABLETS IN THE EVENING 90 tablet 1    gabapentin (NEURONTIN) 100 MG capsule Take 2 capsules by mouth 2 times daily as needed (numbness) for up to 30 days. 120 capsule 5    rOPINIRole (REQUIP) 1 MG tablet Take 1 tablet by mouth nightly 30 tablet 1    carbidopa-levodopa (SINEMET)  MG per tablet Take 2 tablets by mouth 2 times daily 120 tablet 3    fludrocortisone (FLORINEF) 0.1 MG tablet Take 0.1 mg by mouth daily      furosemide (LASIX) 80 MG tablet Take 80 mg by mouth 2 times daily      Misc.  Devices (WALKER) MISC 1 each by Does not apply route daily Needs to be stand up walker 1 each 0    digoxin (LANOXIN) 125 MCG tablet Take 1 tablet by mouth daily (Patient taking differently: Take 62.5 mcg by mouth every other day ) 90 tablet 3    metoprolol tartrate (LOPRESSOR) 25 MG tablet TAKE 1 TABLET BY MOUTH 2 TIMES DAILY 180 tablet 3    Handicap Placard MISC by Does not apply route Duration; 4 years 1 each 0    warfarin (COUMADIN) 5 to limit salt in his diet. · Intermittent Lightheaded and Dizziness, probably multifactorial but at least partially related to his carvidopa levadopa:   · Continue Florinef (fludrocortisone) 0.1 mg daily. I explained that this can cause some increased lower extremity edema but usually this is fairly mild. · Nonpharmacologic counseling: Because of his condition, I reminded him to try and keep himself well-hydrated and to take extra time when moving from laying to sitting, sitting to standing and standing to walking. I also explained to him to help improve his symptoms he should include 3 g sodium diet, 1 or 2 L of sports drinks daily, knee-high compressions stockings. · Restless Legs: Losing Sleep due to his restless legs. · Follow up with Dr. Nichelle Hammonds. Finally, I recommended that he continue his other medications and follow up with you as previously scheduled. FOLLOW UP:   I told Mr. Elizabeth Rojo to call my office if he had any problems, but otherwise told him to Return in about 6 months (around 10/1/2019). However, I would be happy to see him sooner should the need arise. Sincerely,  Regine Osorio. Jazmine BROWN, MS, F.A.C.C. [facility] Cardiology Specialist  [address] [facility], [address] [place] [address]  Phone: [phone], Fax: [phone]    I believe that the risk of significant morbidity and mortality related to the patient's current medical conditions are: low-intermediate. The documentation recorded by the scribe, accurately and completely reflects the services I personally performed and the decisions made by me. Luisito Lucero MD, MS, F.A.C.C.  April 1, 2019

## 2019-04-16 ENCOUNTER — NURSE ONLY (OUTPATIENT)
Dept: CARDIOLOGY | Age: 69
End: 2019-04-16
Payer: MEDICARE

## 2019-04-16 DIAGNOSIS — I42.8 NON-ISCHEMIC CARDIOMYOPATHY (HCC): ICD-10-CM

## 2019-04-16 DIAGNOSIS — I49.5 CHRONOTROPIC INCOMPETENCE WITH SINUS NODE DYSFUNCTION (HCC): ICD-10-CM

## 2019-04-16 DIAGNOSIS — Z95.810 ICD (IMPLANTABLE CARDIOVERTER-DEFIBRILLATOR) IN PLACE: ICD-10-CM

## 2019-04-16 DIAGNOSIS — I50.42 CHRONIC COMBINED SYSTOLIC AND DIASTOLIC CHF, NYHA CLASS 3 (HCC): ICD-10-CM

## 2019-04-16 PROCEDURE — 93295 DEV INTERROG REMOTE 1/2/MLT: CPT | Performed by: FAMILY MEDICINE

## 2019-04-22 ENCOUNTER — TELEPHONE (OUTPATIENT)
Dept: CARDIOLOGY | Age: 69
End: 2019-04-22

## 2019-05-08 ENCOUNTER — HOSPITAL ENCOUNTER (OUTPATIENT)
Dept: PHARMACY | Age: 69
Setting detail: THERAPIES SERIES
Discharge: HOME | End: 2019-05-08
Payer: MEDICARE

## 2019-05-08 VITALS
DIASTOLIC BLOOD PRESSURE: 73 MMHG | WEIGHT: 167.4 LBS | HEART RATE: 70 BPM | SYSTOLIC BLOOD PRESSURE: 120 MMHG | BODY MASS INDEX: 23.35 KG/M2

## 2019-05-08 DIAGNOSIS — Z79.01 LONG TERM CURRENT USE OF ANTICOAGULANT: ICD-10-CM

## 2019-05-08 DIAGNOSIS — I48.0 PAROXYSMAL A-FIB (HCC): ICD-10-CM

## 2019-05-08 LAB — INR BLD: 1.8

## 2019-05-08 PROCEDURE — 85610 PROTHROMBIN TIME: CPT

## 2019-05-08 PROCEDURE — 99211 OFF/OP EST MAY X REQ PHY/QHP: CPT

## 2019-05-08 NOTE — PROGRESS NOTES
Fingerstick INR drawn per clinic protocol. Patient states no visible blood in urine and no black tarry stool. Denies any missed doses of warfarin. No change in other maintenance medications or in diet. Patient states he only drank 1 or 2 beers yesterday, and normally he drinks about 5 beers each day. Patient will take warfarin 7.5 mg today. Will continue current warfarin regimen and recheck INR in 6 week(s). Patient acknowledges working in consult agreement with pharmacist as referred by his/her physician.

## 2019-05-08 NOTE — PATIENT INSTRUCTIONS
Please take warfarin 7.5 mg today. Continue current dose of warfarin as instructed on dosing calendar provided. Continue to monitor urine and stool for signs and symptoms of bleeding. Please notify the clinic of any medication changes. Please remember to bring all medications (both prescription and OTC) to your next visit. Kindly notify the clinic if you are unable to make to your next appointment.

## 2019-06-19 ENCOUNTER — HOSPITAL ENCOUNTER (OUTPATIENT)
Dept: PHARMACY | Age: 69
Setting detail: THERAPIES SERIES
Discharge: HOME | End: 2019-06-19
Payer: MEDICARE

## 2019-06-19 VITALS
SYSTOLIC BLOOD PRESSURE: 124 MMHG | BODY MASS INDEX: 22.73 KG/M2 | WEIGHT: 163 LBS | HEART RATE: 69 BPM | DIASTOLIC BLOOD PRESSURE: 87 MMHG

## 2019-06-19 DIAGNOSIS — Z79.01 LONG TERM CURRENT USE OF ANTICOAGULANT: ICD-10-CM

## 2019-06-19 DIAGNOSIS — I48.0 PAROXYSMAL A-FIB (HCC): ICD-10-CM

## 2019-06-19 LAB — INR BLD: 1.6

## 2019-06-19 PROCEDURE — G0463 HOSPITAL OUTPT CLINIC VISIT: HCPCS

## 2019-06-19 PROCEDURE — 99212 OFFICE O/P EST SF 10 MIN: CPT

## 2019-06-19 PROCEDURE — 85610 PROTHROMBIN TIME: CPT

## 2019-06-19 RX ORDER — LISINOPRIL 2.5 MG/1
1.25 TABLET ORAL DAILY
Refills: 5 | COMMUNITY
Start: 2019-06-02 | End: 2020-01-08 | Stop reason: ALTCHOICE

## 2019-06-19 RX ORDER — GABAPENTIN 100 MG/1
200 CAPSULE ORAL 2 TIMES DAILY PRN
Refills: 5 | COMMUNITY
Start: 2019-05-11 | End: 2019-06-19 | Stop reason: SDUPTHER

## 2019-06-19 NOTE — PATIENT INSTRUCTIONS
Continue to monitor urine and stool. Continue to monitor for signs of bleeding. Return to clinic in 3 weeks. Increase warfarin dosage to 5 mg on Wednesdays and Saturdays and half tablet (2.5 mg) all other days.

## 2019-06-19 NOTE — PROGRESS NOTES
Patient states no visible blood in urine and no black tarry stool. No change in other medications. Will return to clinic in 3 weeks. Patient denies any missed doses of warfarin. Patient is drinking a little less alcohol because dialysis told him he was drinking too many fluids. Patient will increase warfarin dosage to 5 mg on Wednesdays and Saturdays and half tablet (2.5 mg) all other days.

## 2019-06-20 DIAGNOSIS — I95.1 DYSAUTONOMIA ORTHOSTATIC HYPOTENSION SYNDROME: ICD-10-CM

## 2019-06-20 DIAGNOSIS — Z95.810 ICD (IMPLANTABLE CARDIOVERTER-DEFIBRILLATOR) IN PLACE: ICD-10-CM

## 2019-06-20 DIAGNOSIS — I48.0 PAF (PAROXYSMAL ATRIAL FIBRILLATION) (HCC): ICD-10-CM

## 2019-06-20 DIAGNOSIS — I50.42 CHRONIC COMBINED SYSTOLIC AND DIASTOLIC CHF, NYHA CLASS 3 (HCC): ICD-10-CM

## 2019-06-20 RX ORDER — WARFARIN SODIUM 5 MG/1
TABLET ORAL
Qty: 90 TABLET | Refills: 3 | Status: SHIPPED | OUTPATIENT
Start: 2019-06-20 | End: 2020-08-31

## 2019-07-08 ENCOUNTER — HOSPITAL ENCOUNTER (OUTPATIENT)
Dept: PHARMACY | Age: 69
Setting detail: THERAPIES SERIES
Discharge: HOME | End: 2019-07-08
Payer: MEDICARE

## 2019-07-08 VITALS
DIASTOLIC BLOOD PRESSURE: 55 MMHG | HEART RATE: 74 BPM | BODY MASS INDEX: 22.73 KG/M2 | SYSTOLIC BLOOD PRESSURE: 96 MMHG | WEIGHT: 163 LBS

## 2019-07-08 DIAGNOSIS — Z79.01 LONG TERM CURRENT USE OF ANTICOAGULANT: ICD-10-CM

## 2019-07-08 DIAGNOSIS — I48.0 PAROXYSMAL A-FIB (HCC): ICD-10-CM

## 2019-07-08 LAB — INR BLD: 2.1

## 2019-07-08 PROCEDURE — 85610 PROTHROMBIN TIME: CPT

## 2019-07-08 PROCEDURE — 99211 OFF/OP EST MAY X REQ PHY/QHP: CPT

## 2019-07-08 NOTE — PROGRESS NOTES
Patient states no visible blood in urine and no black tarry stool. No change in other medications. Will return to clinic in 5 weeks. Patient like to go to the [employer] and drink.

## 2019-07-16 ENCOUNTER — NURSE ONLY (OUTPATIENT)
Dept: CARDIOLOGY | Age: 69
End: 2019-07-16
Payer: MEDICARE

## 2019-07-16 DIAGNOSIS — Z95.810 ICD (IMPLANTABLE CARDIOVERTER-DEFIBRILLATOR) IN PLACE: ICD-10-CM

## 2019-07-16 DIAGNOSIS — I42.8 NICM (NONISCHEMIC CARDIOMYOPATHY) (HCC): Primary | ICD-10-CM

## 2019-07-16 PROCEDURE — 93295 DEV INTERROG REMOTE 1/2/MLT: CPT | Performed by: FAMILY MEDICINE

## 2019-07-16 PROCEDURE — 93296 REM INTERROG EVL PM/IDS: CPT | Performed by: FAMILY MEDICINE

## 2019-07-17 ENCOUNTER — HOSPITAL ENCOUNTER (OUTPATIENT)
Age: 69
Discharge: HOME | End: 2019-07-19
Payer: MEDICARE

## 2019-07-17 ENCOUNTER — TELEPHONE (OUTPATIENT)
Dept: CARDIOLOGY | Age: 69
End: 2019-07-17

## 2019-07-17 ENCOUNTER — HOSPITAL ENCOUNTER (OUTPATIENT)
Dept: GENERAL RADIOLOGY | Age: 69
Discharge: HOME | End: 2019-07-19
Payer: MEDICARE

## 2019-07-17 DIAGNOSIS — M53.3 SACRAL BACK PAIN: ICD-10-CM

## 2019-07-17 PROCEDURE — 72100 X-RAY EXAM L-S SPINE 2/3 VWS: CPT

## 2019-08-09 ENCOUNTER — HOSPITAL ENCOUNTER (OUTPATIENT)
Dept: PHARMACY | Age: 69
Setting detail: THERAPIES SERIES
Discharge: HOME | End: 2019-08-09
Payer: MEDICARE

## 2019-08-09 VITALS
SYSTOLIC BLOOD PRESSURE: 74 MMHG | HEART RATE: 70 BPM | DIASTOLIC BLOOD PRESSURE: 43 MMHG | BODY MASS INDEX: 21.98 KG/M2 | WEIGHT: 157.6 LBS

## 2019-08-09 DIAGNOSIS — I48.0 PAROXYSMAL A-FIB (HCC): ICD-10-CM

## 2019-08-09 DIAGNOSIS — Z79.01 LONG TERM CURRENT USE OF ANTICOAGULANT: ICD-10-CM

## 2019-08-09 LAB — INR BLD: 2

## 2019-08-09 PROCEDURE — 99211 OFF/OP EST MAY X REQ PHY/QHP: CPT

## 2019-08-09 PROCEDURE — 85610 PROTHROMBIN TIME: CPT

## 2019-09-06 ENCOUNTER — HOSPITAL ENCOUNTER (OUTPATIENT)
Dept: PHARMACY | Age: 69
Setting detail: THERAPIES SERIES
Discharge: HOME | End: 2019-09-06
Payer: MEDICARE

## 2019-09-06 VITALS
WEIGHT: 159 LBS | HEART RATE: 71 BPM | SYSTOLIC BLOOD PRESSURE: 88 MMHG | DIASTOLIC BLOOD PRESSURE: 56 MMHG | BODY MASS INDEX: 22.18 KG/M2

## 2019-09-06 DIAGNOSIS — I48.0 PAROXYSMAL A-FIB (HCC): ICD-10-CM

## 2019-09-06 DIAGNOSIS — Z79.01 LONG TERM CURRENT USE OF ANTICOAGULANT: ICD-10-CM

## 2019-09-06 LAB — INR BLD: 2.2

## 2019-09-06 PROCEDURE — 85610 PROTHROMBIN TIME: CPT

## 2019-09-06 PROCEDURE — 99211 OFF/OP EST MAY X REQ PHY/QHP: CPT

## 2019-10-18 ENCOUNTER — HOSPITAL ENCOUNTER (OUTPATIENT)
Dept: PHARMACY | Age: 69
Setting detail: THERAPIES SERIES
Discharge: HOME | End: 2019-10-18
Payer: MEDICARE

## 2019-10-18 VITALS
BODY MASS INDEX: 22.18 KG/M2 | DIASTOLIC BLOOD PRESSURE: 64 MMHG | WEIGHT: 159 LBS | SYSTOLIC BLOOD PRESSURE: 94 MMHG | HEART RATE: 72 BPM

## 2019-10-18 DIAGNOSIS — Z79.01 LONG TERM CURRENT USE OF ANTICOAGULANT: ICD-10-CM

## 2019-10-18 DIAGNOSIS — I48.0 PAROXYSMAL A-FIB (HCC): ICD-10-CM

## 2019-10-18 LAB — INR BLD: 2.1

## 2019-10-18 PROCEDURE — 85610 PROTHROMBIN TIME: CPT

## 2019-10-18 PROCEDURE — 99211 OFF/OP EST MAY X REQ PHY/QHP: CPT

## 2019-10-20 ENCOUNTER — HOSPITAL ENCOUNTER (EMERGENCY)
Age: 69
Discharge: HOME | End: 2019-10-20
Payer: MEDICARE

## 2019-10-20 ENCOUNTER — APPOINTMENT (OUTPATIENT)
Dept: GENERAL RADIOLOGY | Age: 69
End: 2019-10-20
Payer: MEDICARE

## 2019-10-20 VITALS
RESPIRATION RATE: 12 BRPM | SYSTOLIC BLOOD PRESSURE: 92 MMHG | DIASTOLIC BLOOD PRESSURE: 52 MMHG | HEART RATE: 70 BPM | OXYGEN SATURATION: 96 % | TEMPERATURE: 97.6 F

## 2019-10-20 DIAGNOSIS — M25.532 LEFT WRIST PAIN: Primary | ICD-10-CM

## 2019-10-20 PROCEDURE — 99283 EMERGENCY DEPT VISIT LOW MDM: CPT

## 2019-10-20 PROCEDURE — 73110 X-RAY EXAM OF WRIST: CPT

## 2019-10-20 ASSESSMENT — PAIN SCALES - GENERAL: PAINLEVEL_OUTOF10: 10

## 2019-10-20 ASSESSMENT — PAIN DESCRIPTION - PAIN TYPE: TYPE: ACUTE PAIN

## 2019-10-20 ASSESSMENT — PAIN DESCRIPTION - LOCATION: LOCATION: HAND;WRIST

## 2019-10-20 ASSESSMENT — PAIN DESCRIPTION - ORIENTATION: ORIENTATION: LEFT

## 2019-10-28 ENCOUNTER — OFFICE VISIT (OUTPATIENT)
Dept: CARDIOLOGY | Age: 69
End: 2019-10-28
Payer: MEDICARE

## 2019-10-28 VITALS
SYSTOLIC BLOOD PRESSURE: 93 MMHG | OXYGEN SATURATION: 92 % | HEIGHT: 71 IN | DIASTOLIC BLOOD PRESSURE: 60 MMHG | HEART RATE: 70 BPM | WEIGHT: 163 LBS | RESPIRATION RATE: 20 BRPM | BODY MASS INDEX: 22.82 KG/M2

## 2019-10-28 DIAGNOSIS — I48.0 PAROXYSMAL A-FIB (HCC): Primary | ICD-10-CM

## 2019-10-28 DIAGNOSIS — I50.42 CHRONIC COMBINED SYSTOLIC AND DIASTOLIC CHF, NYHA CLASS 3 (HCC): ICD-10-CM

## 2019-10-28 DIAGNOSIS — Z51.81 ENCOUNTER FOR MONITORING DIGOXIN THERAPY: ICD-10-CM

## 2019-10-28 DIAGNOSIS — Z79.899 ENCOUNTER FOR MONITORING DIGOXIN THERAPY: ICD-10-CM

## 2019-10-28 DIAGNOSIS — I42.8 NON-ISCHEMIC CARDIOMYOPATHY (HCC): ICD-10-CM

## 2019-10-28 DIAGNOSIS — I95.2 HYPOTENSION DUE TO DRUGS: ICD-10-CM

## 2019-10-28 PROCEDURE — G8482 FLU IMMUNIZE ORDER/ADMIN: HCPCS | Performed by: FAMILY MEDICINE

## 2019-10-28 PROCEDURE — G8420 CALC BMI NORM PARAMETERS: HCPCS | Performed by: FAMILY MEDICINE

## 2019-10-28 PROCEDURE — G8427 DOCREV CUR MEDS BY ELIG CLIN: HCPCS | Performed by: FAMILY MEDICINE

## 2019-10-28 PROCEDURE — 4040F PNEUMOC VAC/ADMIN/RCVD: CPT | Performed by: FAMILY MEDICINE

## 2019-10-28 PROCEDURE — 1123F ACP DISCUSS/DSCN MKR DOCD: CPT | Performed by: FAMILY MEDICINE

## 2019-10-28 PROCEDURE — 99214 OFFICE O/P EST MOD 30 MIN: CPT | Performed by: FAMILY MEDICINE

## 2019-10-28 PROCEDURE — 1036F TOBACCO NON-USER: CPT | Performed by: FAMILY MEDICINE

## 2019-10-28 PROCEDURE — 3017F COLORECTAL CA SCREEN DOC REV: CPT | Performed by: FAMILY MEDICINE

## 2019-10-31 ENCOUNTER — HOSPITAL ENCOUNTER (OUTPATIENT)
Age: 69
Discharge: HOME | End: 2019-10-31
Payer: MEDICARE

## 2019-10-31 DIAGNOSIS — I95.2 HYPOTENSION DUE TO DRUGS: ICD-10-CM

## 2019-10-31 DIAGNOSIS — I50.42 CHRONIC COMBINED SYSTOLIC AND DIASTOLIC CHF, NYHA CLASS 3 (HCC): ICD-10-CM

## 2019-10-31 DIAGNOSIS — I48.0 PAROXYSMAL A-FIB (HCC): ICD-10-CM

## 2019-10-31 DIAGNOSIS — I42.8 NON-ISCHEMIC CARDIOMYOPATHY (HCC): ICD-10-CM

## 2019-10-31 LAB
DIGOXIN DATE LAST DOSE: NORMAL
DIGOXIN DOSE AMOUNT: NORMAL
DIGOXIN DOSE TIME: NORMAL
DIGOXIN LEVEL: 1.6 NG/ML (ref 0.5–2)

## 2019-10-31 PROCEDURE — 36415 COLL VENOUS BLD VENIPUNCTURE: CPT

## 2019-10-31 PROCEDURE — 80162 ASSAY OF DIGOXIN TOTAL: CPT

## 2019-11-07 DIAGNOSIS — I42.8 NON-ISCHEMIC CARDIOMYOPATHY (HCC): ICD-10-CM

## 2019-11-07 DIAGNOSIS — I48.0 PAROXYSMAL A-FIB (HCC): ICD-10-CM

## 2019-11-07 DIAGNOSIS — I50.42 CHRONIC COMBINED SYSTOLIC AND DIASTOLIC CHF, NYHA CLASS 3 (HCC): ICD-10-CM

## 2019-11-07 DIAGNOSIS — I95.2 HYPOTENSION DUE TO DRUGS: ICD-10-CM

## 2019-11-19 PROBLEM — L82.0 SEBORRHEIC KERATOSIS, INFLAMED: Status: ACTIVE | Noted: 2019-11-19

## 2019-11-29 ENCOUNTER — HOSPITAL ENCOUNTER (OUTPATIENT)
Dept: PHARMACY | Age: 69
Setting detail: THERAPIES SERIES
Discharge: HOME | End: 2019-11-29
Payer: MEDICARE

## 2019-11-29 VITALS
DIASTOLIC BLOOD PRESSURE: 55 MMHG | WEIGHT: 163.2 LBS | BODY MASS INDEX: 22.76 KG/M2 | SYSTOLIC BLOOD PRESSURE: 101 MMHG | HEART RATE: 74 BPM

## 2019-11-29 DIAGNOSIS — I48.0 PAROXYSMAL A-FIB (HCC): ICD-10-CM

## 2019-11-29 DIAGNOSIS — Z79.01 LONG TERM CURRENT USE OF ANTICOAGULANT: ICD-10-CM

## 2019-11-29 LAB — INR BLD: 3.3

## 2019-11-29 PROCEDURE — 99212 OFFICE O/P EST SF 10 MIN: CPT | Performed by: FAMILY MEDICINE

## 2019-11-29 PROCEDURE — 85610 PROTHROMBIN TIME: CPT | Performed by: FAMILY MEDICINE

## 2019-12-17 RX ORDER — DIGOXIN 125 MCG
TABLET ORAL
Qty: 90 TABLET | Refills: 3 | Status: SHIPPED | OUTPATIENT
Start: 2019-12-17

## 2019-12-19 ENCOUNTER — HOSPITAL ENCOUNTER (OUTPATIENT)
Age: 69
Discharge: HOME | End: 2019-12-21
Payer: MEDICARE

## 2019-12-19 ENCOUNTER — HOSPITAL ENCOUNTER (OUTPATIENT)
Dept: GENERAL RADIOLOGY | Age: 69
Discharge: HOME | End: 2019-12-21
Payer: MEDICARE
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
## 2019-12-19 DIAGNOSIS — M25.551 RIGHT HIP PAIN: ICD-10-CM

## 2019-12-19 PROCEDURE — 73502 X-RAY EXAM HIP UNI 2-3 VIEWS: CPT

## 2020-01-08 ENCOUNTER — HOSPITAL ENCOUNTER (OUTPATIENT)
Dept: PHARMACY | Age: 70
Setting detail: THERAPIES SERIES
Discharge: HOME | End: 2020-01-08
Payer: MEDICARE

## 2020-01-08 VITALS
SYSTOLIC BLOOD PRESSURE: 91 MMHG | HEART RATE: 71 BPM | WEIGHT: 155 LBS | BODY MASS INDEX: 21.62 KG/M2 | DIASTOLIC BLOOD PRESSURE: 51 MMHG

## 2020-01-08 LAB — INR BLD: 3.1

## 2020-01-08 PROCEDURE — 99211 OFF/OP EST MAY X REQ PHY/QHP: CPT

## 2020-01-08 PROCEDURE — 85610 PROTHROMBIN TIME: CPT

## 2020-01-28 ENCOUNTER — NURSE ONLY (OUTPATIENT)
Dept: CARDIOLOGY | Age: 70
End: 2020-01-28
Payer: MEDICARE

## 2020-01-28 PROCEDURE — 93295 DEV INTERROG REMOTE 1/2/MLT: CPT | Performed by: FAMILY MEDICINE

## 2020-01-30 ENCOUNTER — TELEPHONE (OUTPATIENT)
Dept: CARDIOLOGY | Age: 70
End: 2020-01-30

## 2020-02-20 ENCOUNTER — APPOINTMENT (OUTPATIENT)
Dept: GENERAL RADIOLOGY | Age: 70
End: 2020-02-20
Payer: MEDICARE

## 2020-02-20 ENCOUNTER — HOSPITAL ENCOUNTER (EMERGENCY)
Age: 70
Discharge: HOME | End: 2020-02-20
Attending: EMERGENCY MEDICINE
Payer: MEDICARE

## 2020-02-20 VITALS
HEART RATE: 75 BPM | WEIGHT: 165 LBS | BODY MASS INDEX: 23.1 KG/M2 | TEMPERATURE: 96.8 F | SYSTOLIC BLOOD PRESSURE: 89 MMHG | DIASTOLIC BLOOD PRESSURE: 53 MMHG | HEIGHT: 71 IN | OXYGEN SATURATION: 99 % | RESPIRATION RATE: 18 BRPM

## 2020-02-20 PROCEDURE — 99283 EMERGENCY DEPT VISIT LOW MDM: CPT

## 2020-02-20 PROCEDURE — 73130 X-RAY EXAM OF HAND: CPT

## 2020-02-20 PROCEDURE — 73090 X-RAY EXAM OF FOREARM: CPT

## 2020-02-20 PROCEDURE — 73080 X-RAY EXAM OF ELBOW: CPT

## 2020-02-20 ASSESSMENT — PAIN SCALES - GENERAL: PAINLEVEL_OUTOF10: 4

## 2020-02-20 ASSESSMENT — PAIN DESCRIPTION - DESCRIPTORS: DESCRIPTORS: SORE

## 2020-02-20 ASSESSMENT — PAIN DESCRIPTION - ORIENTATION: ORIENTATION: LEFT

## 2020-02-20 ASSESSMENT — PAIN DESCRIPTION - LOCATION: LOCATION: HAND

## 2020-02-20 NOTE — ED PROVIDER NOTES
[address] [facility] COMPLAINT   Chief Complaint   Patient presents with    Hand Pain     Pt c/o swelling, pain to left hand. PMS intact. Pt reports limited ROM d/t discomfort    Fall     Pt fell in his kitchen 2 days ago while laying carpet     Joint Swelling        HPI   Mary Ann Venegas is a 68 y.o. male who presents with an injury to the left hand, caused by falling, that occurred 4 days afo. The duration of the pain has been constant since the onset. The pain is a 4  /10. Associated with this injury, the patient has a to  of swelling and also some elbow pain. REVIEW OF SYSTEMS   Musculoskeletal: + left  pain, no other bony or joint injury   Skin: No lacerations or redness  Neurologic: No numbness or focal extremity weakness      PAST MEDICAL & SURGICAL HISTORY   Past Medical History:   Diagnosis Date    CAD (coronary artery disease)     [facility] 2013    left ear    Chronic combined systolic and diastolic CHF, NYHA class 3 ([facility]     EF 25-30%    CRF (chronic renal failure) / stage 4 4/23/2013    Dysautonomia orthostatic hypotension syndrome ([facility] 5/12/2016    H/O echocardiogram 04/2016    EF25-30% Mildly increased LV wall thickness with a dialeted LV cavity size LA is mildly dialated 29-33 withLA volume index of 29ml/m2 Normal aortic valve structure with mod aortic regurg Normal mitral valve structure with od mitral regurg Mild Pulm htn est RV systolic pressure of 03TABM calcification of tricuspid valve leaflets mild tricuspid regurg Mild dyastolic dysfunction seen    Hemodialysis patient ([facility]     Tuesday, Thursday, Saturday -- Cordell Kahn [address]    History of blood transfusion 2013    History of echocardiogram 6/6/13    LMCA: mild irreg 10-20%, LAD: mild irreg 10-20%, LCx: mild irreg 10-20%, RCA: mild irreg 10-20% The conus branch of the RCA had separate ostis off of the aorta, prly of little if any clinical significance.  History of tilt table evaluation 10/17/2018    Negative.  ICD (implantable cardiac defibrillator), single, in situ 12/16/13    Biotronic    Mixed hyperlipidemia     Non-ischemic cardiomyopathy ([facility] 6/10/2013    EF 25-30%     Restless leg syndrome     Systolic CHF, acute on chronic ([facility] 4/23/2013     Past Surgical History:   Procedure Laterality Date    CARDIAC CATHETERIZATION  6/6/2013    Dr. Charis Chavarria  2013    DIALYSIS FISTULA CREATION  01/18/2017    Left Upper Arm- Dr. Judith Parada Right 11/22/2017    RUE per Dr. Judith Parada Right 11/22/2017    AV FISTULA CREATION REVISION-UPPER EXTREMITY performed by Gloria Guy DO at [address] [place], COLON, DIAGNOSTIC      EXCISION / BIOPSY SKIN LESION OF HEAD / NECK Left 6/19/2017    NECK LESION BIOPSY EXCISION, BCC, FROZEN SECTION performed by Cameron May MD at [address] Right HIP    OTHER SURGICAL HISTORY  11/2016    Right chest port for dialysis    PACEMAKER PLACEMENT  2013    AICD    PRE-MALIGNANT / BENIGN SKIN LESION EXCISION Left 06/19/2017    Neck area    SKIN BIOPSY  9-    left axila    SKIN CANCER EXCISION Left 06/05/2015    Ear    TYMPANOPLASTY Left 05/12/2016    per Dr. Kaleigh Deleon   Current Outpatient Rx   Medication Sig Dispense Refill    rOPINIRole (REQUIP) 1 MG tablet Take 1 tablet by mouth nightly 30 tablet 1    digoxin (LANOXIN) 125 MCG tablet TAKE 1 TABLET BY MOUTH EVERY DAY 90 tablet 3    carbidopa-levodopa (SINEMET)  MG per tablet Take 2 tablets by mouth 2 times daily 360 tablet 1    lisinopril (PRINIVIL;ZESTRIL) 2.5 MG tablet Take 0.5 tablets by mouth daily 45 tablet 3    metoprolol tartrate (LOPRESSOR) 25 MG tablet TAKE 1 TABLET BY MOUTH 2 TIMES DAILY 180 tablet 3    gabapentin (NEURONTIN) 100 MG capsule Take 2 capsules by mouth 2 times daily as needed (numbness) for up to 30 days.  120 capsule 5    warfarin (COUMADIN) 5 MG tablet TAKE 1 TABLET BY MOUTH EVERY DAY (Patient taking differently: Take 5 mg by mouth daily ) 90 tablet 3    fludrocortisone (FLORINEF) 0.1 MG tablet Take 0.1 mg by mouth daily      furosemide (LASIX) 80 MG tablet Take 80 mg by mouth 2 times daily      sodium bicarbonate 650 MG tablet Take 325 mg by mouth 2 times daily   6    allopurinol (ZYLOPRIM) 100 MG tablet Take 1 tablet by mouth daily 30 tablet 5    Misc. Devices (WALKER) MISC 1 each by Does not apply route daily Needs to be stand up walker 1 each 0    Handicap Placard MISC by Does not apply route Duration; 4 years 1 each 0    midodrine (PROAMATINE) 5 MG tablet Take 1 tablet by mouth 2 times daily (with meals) (Patient taking differently: Take 5 mg by mouth daily Take 1tab prior to dialysis and 1 tab during dialysis mid treatment (Tuesday Thursday and Saturday)) 90 tablet 3        ALLERGIES   Allergies   Allergen Reactions    Ancef [Cefazolin] Anaphylaxis    Marcaine [Bupivacaine Hcl] Anaphylaxis        SOCIAL & FAMILY HISTORY   Social History     Socioeconomic History    Marital status: Single     Spouse name: None    Number of children: None    Years of education: None    Highest education level: None   Occupational History    Occupation: retired   Social Needs    Financial resource strain: Not hard at all   Ongage insecurity:     Worry: Never true     Inability: Never true   [employer] needs:     Medical: No     Non-medical: No   Tobacco Use    Smoking status: Never Smoker    Smokeless tobacco: Never Used   Substance and Sexual Activity    Alcohol use:  Yes     Alcohol/week: 0.0 standard drinks     Types: 3 - 4 Cans of beer per week     Comment: daily drinker BEER 3-4    Drug use: Yes     Comment: Remote years ago    Sexual activity: None   Lifestyle    Physical activity:     Days per week: 2 days     Minutes per session: 10 min    Stress: Not at all   Relationships    Social connections:     Talks on phone: None     Gets together: None Attends Zoroastrian service: None     Active member of club or organization: None     Attends meetings of clubs or organizations: None     Relationship status: None    Intimate partner violence:     Fear of current or ex partner: None     Emotionally abused: None     Physically abused: None     Forced sexual activity: None   Other Topics Concern    None   Social History Narrative    None     Family History   Problem Relation Age of Onset    Heart Disease Father         heart attack in [address]: BP (!) 89/53   Pulse 75   Temp 96.8 °F (36 °C)   Resp 18   Ht 5' 11\" (1.803 m)   Wt 165 lb (74.8 kg)   SpO2 99%   BMI 23.01 kg/m²   Constitutional: Well developed, well nourished  HENT: Atraumatic, airway patent  NECK: Supple   Respiratory: No respiratory distress, no retractions  Cardiovascular: No JVD  Musculoskeletal: left elbow and severe hand    Vascular: radial  pulse 2+  Integument: Well hydrated, no rash   Neurologic: Awake alert, normal flow ofspeech   Psychiatric: Cooperative, pleasant affect     RADIOLOGY/PROCEDURES   XR ELBOW LEFT (MIN 3 VIEWS)   Final Result   1. Moderate soft tissue swelling and edema about the left elbow. 2. Probable loose body in the anterior joint recess measuring up to 5 mm   3. Surgical clips in the left antecubital fossa. 4. Mild degenerative changes of the ulnohumeral joint. 5. No acute fracture or dislocation. XR RADIUS ULNA LEFT (2 VIEWS)   Final Result   Possible avulsion fracture medial epicondyle, age indeterminate. Dedicated   elbow films may be helpful if clinically warranted. Vascular calcifications noted. Surgical clips noted in the antecubital fossa. XR HAND LEFT (MIN 3 VIEWS)   Final Result   1. Severe soft tissue swelling at the dorsal aspect of the hand and ulnar   aspect of the wrist.  Findings could represent cellulitis or posttraumatic   soft tissue swelling/hematoma. 2. Diffuse osteopenia.   Mild osteoarthrosis. 3. No acute fracture or dislocation. ED COURSE & MEDICAL DECISION MAKING   Pertinent Imaging studies reviewed and interpreted. (See chart for details)     Differential diagnosis: includes but not limited to Arterial Injury/Ischemia, Fracture, Dislocation, Compartment Syndrome, Neurologic Deficit/Injury. Mdm: Patient well-appearing. Has had a swelling he does not appear to have a compartment syndrome. I encouraged him to continue icing and elevating it. FINAL IMPRESSION   1.  Contusion of left hand, initial encounter        PLAN:   Outpatient treatment, follow-up, and discharge instructions (see EMR)    Electronically signed by: Perla Henderson MD, 2/20/2020 6:08 PM         Perla Henderson MD  02/20/20 0183

## 2020-03-02 ENCOUNTER — HOSPITAL ENCOUNTER (OUTPATIENT)
Dept: PHARMACY | Age: 70
Setting detail: THERAPIES SERIES
Discharge: HOME | End: 2020-03-02
Payer: MEDICARE

## 2020-03-02 VITALS
HEART RATE: 77 BPM | DIASTOLIC BLOOD PRESSURE: 50 MMHG | SYSTOLIC BLOOD PRESSURE: 93 MMHG | WEIGHT: 161 LBS | BODY MASS INDEX: 22.45 KG/M2

## 2020-03-02 LAB — INR BLD: 3

## 2020-03-02 PROCEDURE — 99211 OFF/OP EST MAY X REQ PHY/QHP: CPT

## 2020-03-02 PROCEDURE — 85610 PROTHROMBIN TIME: CPT

## 2020-03-02 NOTE — PATIENT INSTRUCTIONS
Continue to monitor urine and stool. Continue to monitor for signs of bleeding. Return to clinic in 6 weeks. Continue whole 5 mg tablet on Wednesdays and Saturdays and half tablet for 2.5 mg all other days.

## 2020-03-27 ENCOUNTER — HOSPITAL ENCOUNTER (OUTPATIENT)
Age: 70
Discharge: HOME | End: 2020-03-29
Payer: MEDICARE

## 2020-03-27 ENCOUNTER — HOSPITAL ENCOUNTER (OUTPATIENT)
Dept: GENERAL RADIOLOGY | Age: 70
Discharge: HOME | End: 2020-03-29
Payer: MEDICARE

## 2020-03-27 PROCEDURE — 70110 X-RAY EXAM OF JAW 4/> VIEWS: CPT

## 2020-04-02 ENCOUNTER — OFFICE VISIT (OUTPATIENT)
Dept: CARDIOLOGY | Age: 70
End: 2020-04-02
Payer: MEDICARE

## 2020-04-02 ENCOUNTER — TELEPHONE (OUTPATIENT)
Dept: CARDIOLOGY | Age: 70
End: 2020-04-02

## 2020-04-02 ENCOUNTER — HOSPITAL ENCOUNTER (OUTPATIENT)
Age: 70
Discharge: HOME | End: 2020-04-04
Payer: MEDICARE

## 2020-04-02 ENCOUNTER — HOSPITAL ENCOUNTER (OUTPATIENT)
Dept: GENERAL RADIOLOGY | Age: 70
Discharge: HOME | End: 2020-04-04
Payer: MEDICARE

## 2020-04-02 ENCOUNTER — HOSPITAL ENCOUNTER (OUTPATIENT)
Age: 70
Discharge: HOME | End: 2020-04-02
Payer: MEDICARE

## 2020-04-02 VITALS
RESPIRATION RATE: 18 BRPM | SYSTOLIC BLOOD PRESSURE: 65 MMHG | HEART RATE: 69 BPM | HEIGHT: 71 IN | OXYGEN SATURATION: 92 % | WEIGHT: 148.6 LBS | DIASTOLIC BLOOD PRESSURE: 33 MMHG | BODY MASS INDEX: 20.8 KG/M2

## 2020-04-02 PROBLEM — T82.110A PACEMAKER LEAD FRACTURE, INITIAL ENCOUNTER: Status: ACTIVE | Noted: 2020-04-02

## 2020-04-02 LAB
ANION GAP SERPL CALCULATED.3IONS-SCNC: 12 MMOL/L (ref 9–17)
BUN BLDV-MCNC: 14 MG/DL (ref 8–23)
BUN/CREAT BLD: 5 (ref 9–20)
CALCIUM SERPL-MCNC: 8.5 MG/DL (ref 8.6–10.4)
CHLORIDE BLD-SCNC: 94 MMOL/L (ref 98–107)
CO2: 29 MMOL/L (ref 20–31)
CREAT SERPL-MCNC: 2.65 MG/DL (ref 0.7–1.2)
GFR AFRICAN AMERICAN: 29 ML/MIN
GFR NON-AFRICAN AMERICAN: 24 ML/MIN
GFR SERPL CREATININE-BSD FRML MDRD: ABNORMAL ML/MIN/{1.73_M2}
GFR SERPL CREATININE-BSD FRML MDRD: ABNORMAL ML/MIN/{1.73_M2}
GLUCOSE BLD-MCNC: 84 MG/DL (ref 70–99)
POTASSIUM SERPL-SCNC: 3.9 MMOL/L (ref 3.7–5.3)
SODIUM BLD-SCNC: 135 MMOL/L (ref 135–144)

## 2020-04-02 PROCEDURE — G8420 CALC BMI NORM PARAMETERS: HCPCS | Performed by: FAMILY MEDICINE

## 2020-04-02 PROCEDURE — G8427 DOCREV CUR MEDS BY ELIG CLIN: HCPCS | Performed by: FAMILY MEDICINE

## 2020-04-02 PROCEDURE — 1123F ACP DISCUSS/DSCN MKR DOCD: CPT | Performed by: FAMILY MEDICINE

## 2020-04-02 PROCEDURE — 99214 OFFICE O/P EST MOD 30 MIN: CPT | Performed by: FAMILY MEDICINE

## 2020-04-02 PROCEDURE — 3017F COLORECTAL CA SCREEN DOC REV: CPT | Performed by: FAMILY MEDICINE

## 2020-04-02 PROCEDURE — 93283 PRGRMG EVAL IMPLANTABLE DFB: CPT | Performed by: FAMILY MEDICINE

## 2020-04-02 PROCEDURE — 36415 COLL VENOUS BLD VENIPUNCTURE: CPT

## 2020-04-02 PROCEDURE — 71046 X-RAY EXAM CHEST 2 VIEWS: CPT

## 2020-04-02 PROCEDURE — 80048 BASIC METABOLIC PNL TOTAL CA: CPT

## 2020-04-02 PROCEDURE — 1036F TOBACCO NON-USER: CPT | Performed by: FAMILY MEDICINE

## 2020-04-02 PROCEDURE — 4040F PNEUMOC VAC/ADMIN/RCVD: CPT | Performed by: FAMILY MEDICINE

## 2020-04-02 NOTE — PROGRESS NOTES
MS, F.A.C.C. [facility] Cardiology Specialist   [address], [place], [address] [place] [address]  Phone: [phone], Fax: [phone]    I believe that the risk of significant morbidity and mortality related to the patient's current medical conditions are: intermediate-high. The documentation recorded by the scribe, accurately and completely reflects the services I personally performed and the decisions made by me. Rodríguez Laws MD, MS, F.A.C.C.  April 2, 2020

## 2020-04-10 ENCOUNTER — TELEPHONE (OUTPATIENT)
Dept: PHARMACY | Age: 70
End: 2020-04-10

## 2020-04-10 NOTE — TELEPHONE ENCOUNTER
Recent Travel Screening and Travel History documentation:     Travel Screening       Question Response     Have you been in contact with someone who was sick? Do you have any of the following symptoms? In the last month, have you been in contact with someone who was confirmed or suspected to have Coronavirus / COVID-19? Unable to assess (Unable to reach patient via telephone - multiple attempts - voicemail not set up - unable to leave message)     Have you traveled internationally in the last month?        Travel History   Travel since 03/10/20     No documented travel since 03/10/20

## 2020-04-21 ENCOUNTER — TELEPHONE (OUTPATIENT)
Dept: CARDIOLOGY | Age: 70
End: 2020-04-21

## 2020-04-21 NOTE — TELEPHONE ENCOUNTER
Please let patient know that I have referred him to a Dr. Cassidy Gaming as he needs to have one of his pacemaker leads replaced as it appears to be fracturing. I have spoken with Dr. Cassidy Gaming and he has agreed to do a virtual visit with you this week. If he does not have equipment to do this then he can come in our office and we can have him come into our office and he can use our ipad if we need to.

## 2020-04-21 NOTE — TELEPHONE ENCOUNTER
Received RED alert on [employer] monitor- Newest red alert was RV threshold measurement repetitvely failed for at least 7 times. Scanned into media for you to view. Please advise. Thanks so much.

## 2020-04-21 NOTE — TELEPHONE ENCOUNTER
Attempted to call patient but no answer and no way to lvm. Cardiology staff- Please see below note and try to call tomorrow.  Thanks so much

## 2020-04-23 ENCOUNTER — TELEPHONE (OUTPATIENT)
Dept: CARDIOLOGY | Age: 70
End: 2020-04-23

## 2020-04-23 NOTE — TELEPHONE ENCOUNTER
Humberto's nurse @ dialysis called because his bp has been \"in the 60's & 70's last few days and it is difficult to get fluid off of him\"  Dr Jose Angel James suggested she reach out to you for recommendations? Please advise. Thank you!

## 2020-04-27 ENCOUNTER — TELEPHONE (OUTPATIENT)
Dept: CARDIOLOGY | Age: 70
End: 2020-04-27

## 2020-04-27 RX ORDER — MIDODRINE HYDROCHLORIDE 5 MG/1
5 TABLET ORAL 2 TIMES DAILY WITH MEALS
Qty: 90 TABLET | Refills: 3 | Status: SHIPPED | OUTPATIENT
Start: 2020-04-27

## 2020-04-27 NOTE — TELEPHONE ENCOUNTER
Per  increase Midodrine to 20 mg the morning of dialysis after reviewing Cordell Kahn [address]4 labs. Pt notified and verbalized understanding.

## 2020-04-29 ENCOUNTER — HOSPITAL ENCOUNTER (OUTPATIENT)
Dept: PHARMACY | Age: 70
Setting detail: THERAPIES SERIES
Discharge: HOME | End: 2020-04-29
Payer: MEDICARE

## 2020-04-29 VITALS — HEART RATE: 97 BPM

## 2020-04-29 LAB — INR BLD: 4.1

## 2020-04-29 PROCEDURE — 99212 OFFICE O/P EST SF 10 MIN: CPT

## 2020-04-29 PROCEDURE — 85610 PROTHROMBIN TIME: CPT

## 2020-04-29 RX ORDER — CALCIUM ACETATE 667 MG/1
667 CAPSULE ORAL
COMMUNITY
Start: 2020-02-08

## 2020-05-04 ENCOUNTER — TELEPHONE (OUTPATIENT)
Dept: PHARMACY | Age: 70
End: 2020-05-04

## 2020-05-04 NOTE — TELEPHONE ENCOUNTER
Recent Travel Screening and Travel History documentation:     Travel Screening       Question Response     Do you have any of the following symptoms? None of these     In the last month, have you been in contact with someone who was confirmed or suspected to have Coronavirus / COVID-19? No / Unsure     Have you traveled internationally in the last month? No      Travel History   Travel since 04/04/20     No documented travel since 04/04/20         Patient denies fever or respiratory symptoms and understands to stay in car at main entrance for [facility] visit.       Jesús Lopes, PharmD 5/4/2020 10:57 AM

## 2020-05-05 ENCOUNTER — HOSPITAL ENCOUNTER (OUTPATIENT)
Dept: PHARMACY | Age: 70
Setting detail: THERAPIES SERIES
Discharge: HOME | End: 2020-05-05
Payer: MEDICARE

## 2020-05-05 VITALS — TEMPERATURE: 96.9 F

## 2020-05-05 LAB — INR BLD: 4.5

## 2020-05-05 PROCEDURE — 99212 OFFICE O/P EST SF 10 MIN: CPT | Performed by: FAMILY MEDICINE

## 2020-05-05 PROCEDURE — 85610 PROTHROMBIN TIME: CPT | Performed by: FAMILY MEDICINE

## 2020-05-06 ENCOUNTER — OFFICE VISIT (OUTPATIENT)
Dept: CARDIOLOGY | Age: 70
End: 2020-05-06
Payer: MEDICARE

## 2020-05-06 ENCOUNTER — TELEPHONE (OUTPATIENT)
Dept: CARDIOLOGY | Age: 70
End: 2020-05-06

## 2020-05-06 VITALS
OXYGEN SATURATION: 97 % | RESPIRATION RATE: 18 BRPM | DIASTOLIC BLOOD PRESSURE: 53 MMHG | SYSTOLIC BLOOD PRESSURE: 89 MMHG | BODY MASS INDEX: 21.65 KG/M2 | WEIGHT: 154.6 LBS | HEART RATE: 65 BPM | HEIGHT: 71 IN

## 2020-05-06 PROCEDURE — G8420 CALC BMI NORM PARAMETERS: HCPCS | Performed by: FAMILY MEDICINE

## 2020-05-06 PROCEDURE — 93010 ELECTROCARDIOGRAM REPORT: CPT | Performed by: FAMILY MEDICINE

## 2020-05-06 PROCEDURE — 4040F PNEUMOC VAC/ADMIN/RCVD: CPT | Performed by: FAMILY MEDICINE

## 2020-05-06 PROCEDURE — 99211 OFF/OP EST MAY X REQ PHY/QHP: CPT | Performed by: FAMILY MEDICINE

## 2020-05-06 PROCEDURE — 1036F TOBACCO NON-USER: CPT | Performed by: FAMILY MEDICINE

## 2020-05-06 PROCEDURE — 99214 OFFICE O/P EST MOD 30 MIN: CPT | Performed by: FAMILY MEDICINE

## 2020-05-06 PROCEDURE — 1123F ACP DISCUSS/DSCN MKR DOCD: CPT | Performed by: FAMILY MEDICINE

## 2020-05-06 PROCEDURE — G8427 DOCREV CUR MEDS BY ELIG CLIN: HCPCS | Performed by: FAMILY MEDICINE

## 2020-05-06 PROCEDURE — 93005 ELECTROCARDIOGRAM TRACING: CPT | Performed by: FAMILY MEDICINE

## 2020-05-06 PROCEDURE — 3017F COLORECTAL CA SCREEN DOC REV: CPT | Performed by: FAMILY MEDICINE

## 2020-05-06 NOTE — PROGRESS NOTES
abdominal pain, bleeding problems, problems with his medications or any other concerns at this time. Bleeding Risks: Mr. Scott Matute denies any current or recent bleeding problems including a history of a GI bleed, ulcers, recent or upcoming surgeries, blood in his stool or black tarry stools or blood in his urine. Past Medical History:   Diagnosis Date    CAD (coronary artery disease)     Cancer ([facility] 2013    left ear    Chronic combined systolic and diastolic CHF, NYHA class 3 (HCC)     EF 25-30%    CRF (chronic renal failure) / stage 4 4/23/2013    Dysautonomia orthostatic hypotension syndrome ([facility] 5/12/2016    H/O echocardiogram 04/2016    EF25-30% Mildly increased LV wall thickness with a dialeted LV cavity size LA is mildly dialated 29-33 withLA volume index of 29ml/m2 Normal aortic valve structure with mod aortic regurg Normal mitral valve structure with od mitral regurg Mild Pulm htn est RV systolic pressure of 86XESB calcification of tricuspid valve leaflets mild tricuspid regurg Mild dyastolic dysfunction seen    Hemodialysis patient ([facility]     Tuesday, Thursday, Saturday -- Cordell Kahn 1154    History of blood transfusion 2013    History of echocardiogram 6/6/13    LMCA: mild irreg 10-20%, LAD: mild irreg 10-20%, LCx: mild irreg 10-20%, RCA: mild irreg 10-20% The conus branch of the RCA had separate ostis off of the aorta, prly of little if any clinical significance.  History of tilt table evaluation 10/17/2018    Negative.  ICD (implantable cardiac defibrillator), single, in situ 12/16/13    Biotronic    Mixed hyperlipidemia     Non-ischemic cardiomyopathy ([facility] 6/10/2013    EF 25-30%     Restless leg syndrome     Systolic CHF, acute on chronic ([facility] 4/23/2013       CURRENT ALLERGIES: Ancef [cefazolin] and Marcaine [bupivacaine hcl] REVIEW OF SYSTEMS: 14 systems were reviewed. Pertinent positives and negatives as above, all else negative.      Past Surgical History:   Procedure Laterality for a link to the UpToDate guideline \"Atrial Fibrillation: Anticoagulation therapy to prevent embolization    Disclaimer: Risk Score calculation is dependent on accuracy of patient problem list and past encounter diagnosis. Stroke Risk: His CHADS2-VASc score is 2/9 (3.2% stroke risk)  Anticoagulation: Continue Warfarin daily. Goal INR 2-3. I also reminded him to watch for signs of blood in his stool or black tarry stools and stop the medication immediately if this develops as this could be life threatening. Chronic Systolic and Diastolic Heart Failure: FK:62-56%: Currently appears to be moderately controlled but 1-2+ lower extremity edema     Beta Blocker: Continue metoprolol tartrate (Lopressor) 25 mg twice daily. Diuretics: Continue furosemide (lasix) 80 mg twice daily. Continue Digoxin 125 mcg once daily  Nonpharmacologic management of Heart Failure: I told him to start wearing lower extremity compression stockings and I advised him to try and keep his legs up whenever possible and to limit salt in his diet. · Hypotension: Intermittent Lightheaded and Dizziness:   · Continue Florinef (fludrocortisone) 0.1 mg daily. I explained that this can cause some increased lower extremity edema but usually this is fairly mild. · Nonpharmacologic counseling: Because of his condition, I reminded him to try and keep himself well-hydrated and to take extra time when moving from laying to sitting, sitting to standing and standing to walking. I also explained to him to help improve his symptoms he should knee-high compressions stockings.  Implantable Cardioverter Defibrillator (ICD): Red alert on device on 4/1/2020 due to what appear to be a RV lead fracture with increasing impedances and capture thresholds. I will work on trying to get an appointment set up for him with Dr. Lily Mccarty in [place] by the end of this week.     Indication for Device Placement: Non Ischemic Cardiomyopathy   Interrogation Findings: We will plan to recheck their device at their next scheduled appointment date. Finally, I recommended that he continue his other medications and follow up with you as previously scheduled. FOLLOW UP:   I told Mr. Jeff Diaz to call my office if he had any problems, but otherwise told him to Return in about 3 weeks (around 5/27/2020). However, I would be happy to see him sooner should the need arise. Sincerely,  Theron Laguerre. Jazmine BROWN, MS, F.A.C.C. [facility] Cardiology Specialist   [address] [place] [address] Elie Gordon Tonja [address], [address] [facility]  Phone: [phone], Fax: [phone]    I believe that the risk of significant morbidity and mortality related to the patient's current medical conditions are: Intermediate. The documentation recorded by the scribe, accurately and completely reflects the services I personally performed and the decisions made by me. Ed Ceballos MD, MS, F.A.C.C.  May 6, 2020

## 2020-05-12 ENCOUNTER — NURSE ONLY (OUTPATIENT)
Dept: CARDIOLOGY | Age: 70
End: 2020-05-12
Payer: MEDICARE

## 2020-05-12 PROCEDURE — 93295 DEV INTERROG REMOTE 1/2/MLT: CPT | Performed by: FAMILY MEDICINE

## 2020-05-13 ENCOUNTER — TELEPHONE (OUTPATIENT)
Dept: PHARMACY | Age: 70
End: 2020-05-13

## 2020-05-13 NOTE — TELEPHONE ENCOUNTER
Recent Travel Screening and Travel History documentation:     Travel Screening       Question Response     Have you been in contact with someone who was sick? Do you have any of the following symptoms? Unable to assess     In the last month, have you been in contact with someone who was confirmed or suspected to have Coronavirus / COVID-19? Unable to assess     Have you traveled internationally in the last month? Unable to assess      Travel History   Travel since 04/13/20     No documented travel since 04/13/20           Unable to reach patient via telephone and no voicemail set up. Patient familiar with curbside INR check due to previous visit.

## 2020-05-14 ENCOUNTER — TELEPHONE (OUTPATIENT)
Dept: CARDIOLOGY | Age: 70
End: 2020-05-14

## 2020-05-14 ENCOUNTER — HOSPITAL ENCOUNTER (OUTPATIENT)
Dept: PHARMACY | Age: 70
Setting detail: THERAPIES SERIES
Discharge: HOME | End: 2020-05-14
Payer: MEDICARE

## 2020-05-14 VITALS — TEMPERATURE: 97.7 F

## 2020-05-14 LAB — INR BLD: 1.9

## 2020-05-14 PROCEDURE — 85610 PROTHROMBIN TIME: CPT

## 2020-05-14 PROCEDURE — 99211 OFF/OP EST MAY X REQ PHY/QHP: CPT

## 2020-05-14 NOTE — PATIENT INSTRUCTIONS
Continue to monitor urine and stool. Continue to monitor for signs of bleeding. Return to clinic in 1.5 weeks. Continue warfarin half tablet for 2.5 mg daily.

## 2020-05-15 ENCOUNTER — TELEPHONE (OUTPATIENT)
Dept: CARDIOLOGY | Age: 70
End: 2020-05-15

## 2020-05-18 ENCOUNTER — OFFICE VISIT (OUTPATIENT)
Dept: CARDIOLOGY | Age: 70
End: 2020-05-18
Payer: MEDICARE

## 2020-05-18 ENCOUNTER — HOSPITAL ENCOUNTER (EMERGENCY)
Age: 70
Discharge: ANOTHER HOSPITAL | End: 2020-05-19
Attending: EMERGENCY MEDICINE
Payer: MEDICARE

## 2020-05-18 ENCOUNTER — TELEPHONE (OUTPATIENT)
Dept: CARDIOLOGY | Age: 70
End: 2020-05-18

## 2020-05-18 VITALS
BODY MASS INDEX: 22.45 KG/M2 | SYSTOLIC BLOOD PRESSURE: 84 MMHG | RESPIRATION RATE: 16 BRPM | HEART RATE: 45 BPM | HEIGHT: 70 IN | OXYGEN SATURATION: 96 % | WEIGHT: 156.8 LBS | DIASTOLIC BLOOD PRESSURE: 79 MMHG

## 2020-05-18 LAB
ABSOLUTE EOS #: 0.45 K/UL (ref 0–0.44)
ABSOLUTE IMMATURE GRANULOCYTE: 0.06 K/UL (ref 0–0.3)
ABSOLUTE LYMPH #: 2.3 K/UL (ref 1.1–3.7)
ABSOLUTE MONO #: 0.7 K/UL (ref 0.1–1.2)
ANION GAP SERPL CALCULATED.3IONS-SCNC: 17 MMOL/L (ref 9–17)
BASOPHILS # BLD: 2 % (ref 0–2)
BASOPHILS ABSOLUTE: 0.13 K/UL (ref 0–0.2)
BUN BLDV-MCNC: 29 MG/DL (ref 8–23)
BUN/CREAT BLD: 6 (ref 9–20)
CALCIUM SERPL-MCNC: 8.4 MG/DL (ref 8.6–10.4)
CHLORIDE BLD-SCNC: 99 MMOL/L (ref 98–107)
CO2: 25 MMOL/L (ref 20–31)
CREAT SERPL-MCNC: 4.55 MG/DL (ref 0.7–1.2)
DIFFERENTIAL TYPE: ABNORMAL
EOSINOPHILS RELATIVE PERCENT: 7 % (ref 1–4)
GFR AFRICAN AMERICAN: 16 ML/MIN
GFR NON-AFRICAN AMERICAN: 13 ML/MIN
GFR SERPL CREATININE-BSD FRML MDRD: ABNORMAL ML/MIN/{1.73_M2}
GFR SERPL CREATININE-BSD FRML MDRD: ABNORMAL ML/MIN/{1.73_M2}
GLUCOSE BLD-MCNC: 88 MG/DL (ref 70–99)
HCT VFR BLD CALC: 34.8 % (ref 40.7–50.3)
HEMOGLOBIN: 11.3 G/DL (ref 13–17)
IMMATURE GRANULOCYTES: 1 %
LYMPHOCYTES # BLD: 36 % (ref 24–43)
MAGNESIUM: 1.8 MG/DL (ref 1.6–2.6)
MCH RBC QN AUTO: 33.4 PG (ref 25.2–33.5)
MCHC RBC AUTO-ENTMCNC: 32.5 G/DL (ref 28.4–34.8)
MCV RBC AUTO: 103 FL (ref 82.6–102.9)
MONOCYTES # BLD: 11 % (ref 3–12)
MORPHOLOGY: NORMAL
NRBC AUTOMATED: 0 PER 100 WBC
PDW BLD-RTO: 18.2 % (ref 11.8–14.4)
PLATELET # BLD: 169 K/UL (ref 138–453)
PLATELET ESTIMATE: ABNORMAL
PMV BLD AUTO: 10.7 FL (ref 8.1–13.5)
POTASSIUM SERPL-SCNC: 3.5 MMOL/L (ref 3.7–5.3)
RBC # BLD: 3.38 M/UL (ref 4.21–5.77)
RBC # BLD: ABNORMAL 10*6/UL
SEG NEUTROPHILS: 43 % (ref 36–65)
SEGMENTED NEUTROPHILS ABSOLUTE COUNT: 2.76 K/UL (ref 1.5–8.1)
SODIUM BLD-SCNC: 141 MMOL/L (ref 135–144)
TROPONIN INTERP: ABNORMAL
TROPONIN INTERP: ABNORMAL
TROPONIN T: ABNORMAL NG/ML
TROPONIN T: ABNORMAL NG/ML
TROPONIN, HIGH SENSITIVITY: 54 NG/L (ref 0–22)
TROPONIN, HIGH SENSITIVITY: 61 NG/L (ref 0–22)
WBC # BLD: 6.4 K/UL (ref 3.5–11.3)
WBC # BLD: ABNORMAL 10*3/UL

## 2020-05-18 PROCEDURE — 93005 ELECTROCARDIOGRAM TRACING: CPT | Performed by: FAMILY MEDICINE

## 2020-05-18 PROCEDURE — 83735 ASSAY OF MAGNESIUM: CPT

## 2020-05-18 PROCEDURE — U0003 INFECTIOUS AGENT DETECTION BY NUCLEIC ACID (DNA OR RNA); SEVERE ACUTE RESPIRATORY SYNDROME CORONAVIRUS 2 (SARS-COV-2) (CORONAVIRUS DISEASE [COVID-19]), AMPLIFIED PROBE TECHNIQUE, MAKING USE OF HIGH THROUGHPUT TECHNOLOGIES AS DESCRIBED BY CMS-2020-01-R: HCPCS

## 2020-05-18 PROCEDURE — 4040F PNEUMOC VAC/ADMIN/RCVD: CPT | Performed by: FAMILY MEDICINE

## 2020-05-18 PROCEDURE — G8427 DOCREV CUR MEDS BY ELIG CLIN: HCPCS | Performed by: FAMILY MEDICINE

## 2020-05-18 PROCEDURE — 1036F TOBACCO NON-USER: CPT | Performed by: FAMILY MEDICINE

## 2020-05-18 PROCEDURE — 99284 EMERGENCY DEPT VISIT MOD MDM: CPT

## 2020-05-18 PROCEDURE — 99215 OFFICE O/P EST HI 40 MIN: CPT | Performed by: FAMILY MEDICINE

## 2020-05-18 PROCEDURE — 36415 COLL VENOUS BLD VENIPUNCTURE: CPT

## 2020-05-18 PROCEDURE — 93010 ELECTROCARDIOGRAM REPORT: CPT | Performed by: FAMILY MEDICINE

## 2020-05-18 PROCEDURE — G8420 CALC BMI NORM PARAMETERS: HCPCS | Performed by: FAMILY MEDICINE

## 2020-05-18 PROCEDURE — 93283 PRGRMG EVAL IMPLANTABLE DFB: CPT | Performed by: FAMILY MEDICINE

## 2020-05-18 PROCEDURE — 80048 BASIC METABOLIC PNL TOTAL CA: CPT

## 2020-05-18 PROCEDURE — 85025 COMPLETE CBC W/AUTO DIFF WBC: CPT

## 2020-05-18 PROCEDURE — 3017F COLORECTAL CA SCREEN DOC REV: CPT | Performed by: FAMILY MEDICINE

## 2020-05-18 PROCEDURE — 84484 ASSAY OF TROPONIN QUANT: CPT

## 2020-05-18 PROCEDURE — 1123F ACP DISCUSS/DSCN MKR DOCD: CPT | Performed by: FAMILY MEDICINE

## 2020-05-18 NOTE — PROGRESS NOTES
deficit. Coordination appeared normal.   Skin: Skin is warm and dry. There is no rash or diaphoresis. Psychiatric: He has a normal mood and affect. His speech is normal and behavior is normal.      MOST RECENT LABS ON RECORD:   Lab Results   Component Value Date    WBC 5.7 10/17/2018    HGB 11.9 (L) 10/17/2018    HCT 36.4 (L) 10/17/2018     10/17/2018    CHOL 122 04/25/2016    TRIG 183 (H) 04/25/2016    HDL 33 (L) 04/25/2016    ALT <5 (L) 11/17/2016    AST 12 11/17/2016     04/02/2020    K 3.9 04/02/2020    CL 94 (L) 04/02/2020    CREATININE 2.65 (H) 04/02/2020    BUN 14 04/02/2020    CO2 29 04/02/2020    TSH 1.54 03/11/2015    INR 1.9 05/14/2020     (H) 04/23/2013       ASSESSMENT:  1. PAF (paroxysmal atrial fibrillation) ([facility]    2. Chronic combined systolic and diastolic congestive heart failure (HCC)    3. Other specified hypotension    4. Lightheadedness    5. Dizziness    6. ICD (implantable cardioverter-defibrillator) in place    7. Nonischemic cardiomyopathy (HCC)         PLAN:    · Worsening RV lead fracture with intermittent non-capture of the RV. Symptomatic:  · I called [employer] tech support and we increased his output to 7.5 mv and 0.4 and he again captured. However, I would like to take him to the ER right now to have lab work completed and if there is no readily reversible cause such as hyperkalemia, likely transferred to [facility] to have this lead wire replaced prior to discharge home. · Paroxymal Atrial Fibrillation: Rate Control - HR in office today is only 45 bmp  Beta Blocker: Continue Metoprolol tartrate (Lopressor) 25 mg bid.    Continue Digoxin 125 mcg daily  [id number] Score for Atrial Fibrillation Stroke Risk   Risk   Factors  Component Value   C CHF Yes 1   H HTN No 0   A2 Age >= 75 No,  (73 y.o.) 0   D DM No 0   S2 Prior Stroke/TIA No 0   V Vascular Disease No 0   A Age 74-69 Yes,  (73 y.o.) 1   Sc Sex male 0    [id number]  Score  2   Score last updated

## 2020-05-18 NOTE — ED PROVIDER NOTES
[address] [facility] ED  EMERGENCY DEPARTMENT ENCOUNTER      Pt Name: Shweta Baird  MRN: [id number]  [place] 1950  Date of evaluation: 5/18/2020  Provider: Jarad Mccurdy PA-C    CHIEF COMPLAINT       Chief Complaint   Patient presents with    Fatigue     pt states he has been [place] tired\" for a couple weeks. Pt arrives from Dr. Myrla Peabody office due to a pacemaker issue       HISTORY OF PRESENT ILLNESS    Shweta Baird is a 68 y.o. male who presents to the emergency department from cardiologist office, saw Dr. Sammie Mcdermott, today found his heart rate was 30. The cardiologist stated that he has a right ventricular lead which is fractured from 2 months ago he has been trying to get the patient transferred back to [facility] to have the pacemaker leads repaired although during the [place] pandemic they have been pushing back his appointments to have this procedure but today the patient was seen by the cardiologist and found to have a low heart rate so he had his voltage increased and now is capturing once again although he went from requiring voltage of 1.72 requiring about 4.2 to requiring 7.0 today and cardiologist felt that it was important that the patient be transferred today to [facility] he has contacted the accepting cardiologist at [facility] would like the patient's electrolytes checked because the patient is a dialysis patient Tuesday Thursday Saturday last dialysis 2 days ago he is due again tomorrow. Patient denies chest pain or shortness of breath but states he is felt very fatigued for the past 3 weeks and has no energy. Triage notes and Nursing notes were reviewed by myself. Any discrepancies are addressed above.     PAST MEDICAL HISTORY     Past Medical History:   Diagnosis Date    CAD (coronary artery disease)     Cancer ([facility] 2013    left ear    Chronic combined systolic and diastolic CHF, NYHA class 3 ([facility]     EF 25-30%    CRF (chronic renal failure) / stage 4 4/23/2013    Dysautonomia orthostatic hypotension syndrome ([address] 5/12/2016    H/O echocardiogram 04/2016    EF25-30% Mildly increased LV wall thickness with a dialeted LV cavity size LA is mildly dialated 29-33 withLA volume index of 29ml/m2 Normal aortic valve structure with mod aortic regurg Normal mitral valve structure with od mitral regurg Mild Pulm htn est RV systolic pressure of [id number] calcification of tricuspid valve leaflets mild tricuspid regurg Mild dyastolic dysfunction seen    Hemodialysis patient ([address]     Tuesday, Thursday, Saturday -- [facility]    History of blood transfusion 2013    History of echocardiogram 6/6/13    LMCA: mild irreg 10-20%, LAD: mild irreg 10-20%, LCx: mild irreg 10-20%, RCA: mild irreg 10-20% The conus branch of the RCA had separate ostis off of the aorta, prly of little if any clinical significance.  History of tilt table evaluation 10/17/2018    Negative.      ICD (implantable cardiac defibrillator), single, in situ 12/16/13    Biotronic    Mixed hyperlipidemia     Non-ischemic cardiomyopathy ([address] 6/10/2013    EF 25-30%     Restless leg syndrome     Systolic CHF, acute on chronic ([address] 4/23/2013       SURGICAL HISTORY       Past Surgical History:   Procedure Laterality Date    CARDIAC CATHETERIZATION  6/6/2013    Dr. Madiha Belle  2013    DIALYSIS FISTULA CREATION  01/18/2017    Left Upper Arm- Dr. Dali Hernández Right 11/22/2017    RUE per Dr. Dali Hernández Right 11/22/2017    AV FISTULA CREATION REVISION-UPPER EXTREMITY performed by Robin Lamb DO at [address] [place] [address], DIAGNOSTIC      EXCISION / BIOPSY SKIN LESION OF HEAD / NECK Left 6/19/2017    NECK LESION BIOPSY EXCISION, BCC, FROZEN SECTION performed by Dorthula Ganser, MD at [facility] Right HIP    OTHER SURGICAL HISTORY  11/2016    Right chest port for dialysis    PACEMAKER PLACEMENT  2013    AICD    PRE-MALIGNANT / BENIGN SKIN LESION EXCISION Left 06/19/2017    Neck area    SKIN BIOPSY  9-    left axila    SKIN CANCER EXCISION Left 06/05/2015    Ear    TYMPANOPLASTY Left 05/12/2016    per Dr. Mathew Malloy       Previous Medications    ALLOPURINOL (ZYLOPRIM) 100 MG TABLET    Take 1 tablet by mouth daily    CALCIUM ACETATE, PHOS BINDER, 667 MG CAPS    Take 667 mg by mouth 3 times daily (with meals)    CARBIDOPA-LEVODOPA (SINEMET)  MG PER TABLET    TAKE 2 TABLETS BY MOUTH TWICE A DAY    DIGOXIN (LANOXIN) 125 MCG TABLET    TAKE 1 TABLET BY MOUTH EVERY DAY    FLUDROCORTISONE (FLORINEF) 0.1 MG TABLET    Take 0.1 mg by mouth daily    FUROSEMIDE (LASIX) 80 MG TABLET    Take 80 mg by mouth 2 times daily    GABAPENTIN (NEURONTIN) 100 MG CAPSULE    Take 2 capsules by mouth 2 times daily as needed (numbness) for up to 30 days. HANDICAP PLACARD MISC    by Does not apply route Duration; 4 years    LISINOPRIL (PRINIVIL;ZESTRIL) 2.5 MG TABLET    Take 0.5 tablets by mouth daily    METOPROLOL TARTRATE (LOPRESSOR) 25 MG TABLET    TAKE 1 TABLET BY MOUTH 2 TIMES DAILY    MIDODRINE (PROAMATINE) 5 MG TABLET    Take 1 tablet by mouth 2 times daily (with meals) Per  increase Midodrine to 20 mg on morning of Dialysis. MISC.  DEVICES (WALKER) MISC    1 each by Does not apply route daily Needs to be stand up walker    ROPINIROLE (REQUIP) 1 MG TABLET    Take 1 tablet by mouth nightly    SODIUM BICARBONATE 650 MG TABLET    Take 325 mg by mouth 2 times daily     TRAZODONE (DESYREL) 50 MG TABLET    Take 2 tablets by mouth nightly    WARFARIN (COUMADIN) 5 MG TABLET    TAKE 1 TABLET BY MOUTH EVERY DAY       ALLERGIES     Ancef [cefazolin] and Marcaine [bupivacaine hcl]    FAMILY HISTORY       Family History   Problem Relation Age of Onset    Heart Disease Father         [facility] History     Socioeconomic History    Marital status: Single     Spouse name: Not on file    Number of children: Not on file    Years of education: Not on file    Highest education level: Not on file   Occupational History    Occupation: retired   Social Needs    Financial resource strain: Not hard at all   Kandi-Brian insecurity     Worry: Never true     Inability: Never true   [employer] needs     Medical: No     Non-medical: No   Tobacco Use    Smoking status: Never Smoker    Smokeless tobacco: Never Used   Substance and Sexual Activity    Alcohol use: Yes     Alcohol/week: 0.0 standard drinks     Types: 3 - 4 Cans of beer per week     Comment: daily drinker BEER 3-4    Drug use: Yes     Comment: Remote years ago    Sexual activity: Not on file   Lifestyle    Physical activity     Days per week: 2 days     Minutes per session: 10 min    Stress: Not at all   Relationships    Social connections     Talks on phone: Not on file     Gets together: Not on file     Attends Scientologist service: Not on file     Active member of club or organization: Not on file     Attends meetings of clubs or organizations: Not on file     Relationship status: Not on file    Intimate partner violence     Fear of current or ex partner: Not on file     Emotionally abused: Not on file     Physically abused: Not on file     Forced sexual activity: Not on file   Other Topics Concern    Not on file   Social History Narrative    Not on file       REVIEW OF SYSTEMS     Review of Systems  Except as noted above the remainder of the review of systems was reviewed and is negative. SCREENINGS           PHYSICAL EXAM    (up to 7 for level 4, 8 or more for level 5)     ED Triage Vitals [05/18/20 1641]   BP Temp Temp Source Pulse Resp SpO2 Height Weight   (!) 91/57 97.4 °F (36.3 °C) Oral 74 18 98 % -- 150 lb (68 kg)       Physical Exam  Active and oriented ×3. Nontoxic. No acute distress. Well-hydrated.   Head is atraumatic, facies symmetrical.  Pupils equal round and reactive to light, extraocular movements intact, anterior paced rhythm at 74 bpm.  The patient's cardiologist, Dr. Lisa Parsons, arranges transfer for the patient to [facility] to be admitted under Dr. Yuliya Mueller who agreed to accept the patient in transfer. ED Medications administered this visit:  Medications - No data to display    CONSULTS: (None if blank)  None    Procedures: (None if blank)       CLINICAL       1.  Disorder of cardiac pacemaker system, initial encounter          DISPOSITION/PLAN   DISPOSITION Decision To Transfer 05/18/2020 06:05:29 PM          (Please note that portions of this note were completed with a voice recognition program.  Efforts were made to edit the dictations but occasionallywords are mis-transcribed.)      Stephanie Sanchez II, PA-C (electronically signed)           Stephanie Sanchez II, PA-C  05/18/20 6775

## 2020-05-19 VITALS
WEIGHT: 150 LBS | BODY MASS INDEX: 21.52 KG/M2 | DIASTOLIC BLOOD PRESSURE: 66 MMHG | HEART RATE: 69 BPM | TEMPERATURE: 97.4 F | RESPIRATION RATE: 18 BRPM | SYSTOLIC BLOOD PRESSURE: 94 MMHG | OXYGEN SATURATION: 98 %

## 2020-05-19 LAB
SARS-COV-2, PCR: NORMAL
SARS-COV-2, RAPID: NORMAL
SARS-COV-2: NOT DETECTED
SOURCE: NORMAL

## 2020-05-19 NOTE — ED PROVIDER NOTES
FACULTY SIGN-OUT  ADDENDUM     Care of Jacinda Bustos was assumed from [facility] and is being seen for Fatigue (pt states he has been \"really tired\" for a couple weeks. Pt arrives from Dr. Chand Fuel office due to a pacemaker issue)  . The patient's initial evaluation and plan have been discussed with the prior provider who initially evaluated the patient. ED COURSE      The patient was given the following medications while in the emergency department:      RECENT VITALS:   Temp: 97.4 °F (36.3 °C),  Pulse: 74, Resp: 18, BP: (!) 91/57    MEDICAL DECISION MAKING       This patient is a 68 y.o. Male. Pacemaker malfunction, awaiting transport to [facility]. Accepted, stable vitals.        Jb Davis  Emergency Medicine Attending          Aries Cota DO  05/18/20 8215

## 2020-05-22 ENCOUNTER — TELEPHONE (OUTPATIENT)
Dept: PHARMACY | Age: 70
End: 2020-05-22

## 2020-06-08 ENCOUNTER — OFFICE VISIT (OUTPATIENT)
Dept: CARDIOLOGY | Age: 70
End: 2020-06-08
Payer: MEDICARE

## 2020-06-08 ENCOUNTER — HOSPITAL ENCOUNTER (OUTPATIENT)
Dept: PHARMACY | Age: 70
Setting detail: THERAPIES SERIES
Discharge: HOME | End: 2020-06-08
Payer: MEDICARE

## 2020-06-08 ENCOUNTER — CARE COORDINATION (OUTPATIENT)
Dept: CASE MANAGEMENT | Age: 70
End: 2020-06-08

## 2020-06-08 VITALS
SYSTOLIC BLOOD PRESSURE: 97 MMHG | WEIGHT: 149 LBS | BODY MASS INDEX: 20.86 KG/M2 | OXYGEN SATURATION: 92 % | DIASTOLIC BLOOD PRESSURE: 61 MMHG | HEIGHT: 71 IN | RESPIRATION RATE: 18 BRPM | HEART RATE: 86 BPM

## 2020-06-08 VITALS — TEMPERATURE: 98.6 F

## 2020-06-08 LAB — INR BLD: 1.8

## 2020-06-08 PROCEDURE — 99211 OFF/OP EST MAY X REQ PHY/QHP: CPT | Performed by: FAMILY MEDICINE

## 2020-06-08 PROCEDURE — 1036F TOBACCO NON-USER: CPT | Performed by: FAMILY MEDICINE

## 2020-06-08 PROCEDURE — G8427 DOCREV CUR MEDS BY ELIG CLIN: HCPCS | Performed by: FAMILY MEDICINE

## 2020-06-08 PROCEDURE — 1123F ACP DISCUSS/DSCN MKR DOCD: CPT | Performed by: FAMILY MEDICINE

## 2020-06-08 PROCEDURE — 99214 OFFICE O/P EST MOD 30 MIN: CPT | Performed by: FAMILY MEDICINE

## 2020-06-08 PROCEDURE — 4040F PNEUMOC VAC/ADMIN/RCVD: CPT | Performed by: FAMILY MEDICINE

## 2020-06-08 PROCEDURE — 85610 PROTHROMBIN TIME: CPT

## 2020-06-08 PROCEDURE — 3017F COLORECTAL CA SCREEN DOC REV: CPT | Performed by: FAMILY MEDICINE

## 2020-06-08 PROCEDURE — 99212 OFFICE O/P EST SF 10 MIN: CPT

## 2020-06-08 PROCEDURE — G8420 CALC BMI NORM PARAMETERS: HCPCS | Performed by: FAMILY MEDICINE

## 2020-06-08 RX ORDER — CHOLECALCIFEROL (VITAMIN D3) 10 MCG
1 TABLET ORAL DAILY
COMMUNITY
Start: 2020-05-30 | End: 2020-07-29

## 2020-06-08 NOTE — PATIENT INSTRUCTIONS
SURVEY:    You may be receiving a survey from [employer] regarding your visit today. Please complete the survey to enable us to provide the highest quality of care to you and your family. If you cannot score us a very good on any question, please call the office to discuss how we could have made your experience a very good one. Thank you.

## 2020-06-08 NOTE — CARE COORDINATION
Christina [facility] Transitions Initial Follow Up Call    Call within 2 business days of discharge: Yes    Patient: Tatianna Or Patient : 1950   MRN: <[id number]>  Reason for Admission: PACEMAKER/ICD INTERROGATION    Discharge Date: 20 RARS: No data recorded    Last Discharge [facility]       Complaint Diagnosis Description Type Department Provider    20 Fatigue Disorder of cardiac pacemaker system, initial encounter ED (TRANSFER) [facility] AT THE [facility] ED Hoda Webb, DO; Debbie Asp And. .. Per Patientping patient discharged to home. 1st attempt to make contact for a follow up call, LVM introducing self, role, and nature of the call. Will call again. ADAMARIS BenitezN RN  Care Transition Nurse [phone]               Facility: [facility]. Non-face-to-face services provided:           Follow Up  Future Appointments   Date Time Provider Pascual Reis   2020  1:45 PM [address] [place] [address] Danae   2020  2:00 PM [facility]   2020  8:15 AM SCHEDULE, [facility] TIFFIN CAR BIOTRONIK TIFF CARD [facility]   2020 10:30 AM MD MARIA ESTHER IsabelDeckervilleIngrid Feng, RN

## 2020-06-08 NOTE — PROGRESS NOTES
risk)  Anticoagulation: Continue Warfarin daily. Goal INR 2-3. I also reminded him to watch for signs of blood in his stool or black tarry stools and stop the medication immediately if this develops as this could be life threatening. Chronic Systolic and Diastolic Heart Failure: YO:69-48%: Currently appears to be moderately controlled but 1-2+ lower extremity edema     Beta Blocker: Continue metoprolol tartrate (Lopressor) 25 mg twice daily. Diuretics: Continue furosemide (lasix) 80 mg twice daily. Continue Digoxin 125 mcg once daily.  Bi-Ventricular Implantable Cardioverter Defibrillator: Pt is here today for follow up after his Bi-V ICD placement. He had this done at [address] [place] [address] and his device was switched to a [employer] device.  Indication for Device Placement: Non Ischemic Cardiomyopathy    He is still going to dialysis and still has some arm swelling which will get better. Also I counseled him on keeping an eye on his incision from his device and also to take time with movement. At this time I am ok with him staying on Warfarin however if his site gets any worse than we will hold the Warfarin.  The wound is cleansed, debrided of foreign material as much as possible, and dressed. The patient is alerted to watch for any signs of infection (redness, pus, pain, increased swelling or fever) and call if such occurs. Finally, I recommended that he continue his other medications and follow up with you as previously scheduled. FOLLOW UP:   I told Mr. Melrose Paget to call my office if he had any problems, but otherwise told him to Return in about 2 weeks (around 6/22/2020). However, I would be happy to see him sooner should the need arise. Sincerely,  Delia Emery. Jazmine BROWN, MS, F.A.C.C.   [facility] Cardiology Specialist   [address]  [facility], [address] [place] [address]  Phone: [phone], Fax: [phone]    I believe that the risk of significant morbidity and mortality related to the patient's

## 2020-06-08 NOTE — PROGRESS NOTES
Patient states no visible blood in urine and no black tarry stool. No change in other medications. Will return to clinic in 2 weeks. Patient will take whole 5 mg tablet today then continue warfarin half tablet for 2.5 mg daily. Patient continues to drink alcohol so INR will probably go up now that he is at home again after stay in [facility] then rehab at the [facility]. Found this note in medications during review:  Please repeat INR in 3 days (6/2/20) and fax results to LIZETTE BECERRIL and [facility] [employer] Ainsley Echols MD 1 Each 0 05/30/2020 Active 05- [address] ([id number])     CLINICAL PHARMACY CONSULT: MED RECONCILIATION/REVIEW ADDENDUM    For Pharmacy Admin Tracking Only    PHSO: Yes  Total # of Interventions Recommended: 1  - Increased Dose #: 1  - Maintenance Safety Lab Monitoring #: 1  Recommended intervention potential cost savings:   Accepted intervention potential cost savings:    Total Interventions Accepted: 1  Time Spent (min): 30    Hailey PooleD

## 2020-06-09 ENCOUNTER — CARE COORDINATION (OUTPATIENT)
Dept: CASE MANAGEMENT | Age: 70
End: 2020-06-09

## 2020-06-09 NOTE — CARE COORDINATION
[facility] Transitions Initial Follow Up Call    Call within 2 business days of discharge: Yes    Patient: Arie Holden Patient : 1950   MRN: <[id number]>  Reason for Admission: Disorder of cardiac pacemaker system  Discharge Date: 20 RARS: No data recorded    Last Discharge [facility]       Complaint Diagnosis Description Type Department Provider    20 Fatigue Disorder of cardiac pacemaker system, initial encounter ED (TRANSFER) [facility] AT THE [facility] Da Fleming DO; Clarissa Fong And. .. 2n and final attempt to make contact for a follow up call. Unable to  LVM. d/t mailbox full. HUMBERTO Pena RN  Care Transition Nurse [phone]           Facility: [facility] provided:           Follow Up  Future Appointments   Date Time Provider Pascual Reis   2020  1:00 PM [facility]illo [facility]   2020 11:00 AM [address] [place] [address] MED [facility] [place]   2020  8:15 AM SCHEDULE, [facility] CAR BIOTRONIK TIFF CARD [facility]   2020 10:30 AM Coretta Clayton MD [facility] Diana Laura Alcantara, RN

## 2020-06-22 ENCOUNTER — TELEPHONE (OUTPATIENT)
Dept: PHARMACY | Age: 70
End: 2020-06-22

## 2020-06-22 ENCOUNTER — OFFICE VISIT (OUTPATIENT)
Dept: CARDIOLOGY | Age: 70
End: 2020-06-22
Payer: MEDICARE

## 2020-06-22 VITALS
HEART RATE: 87 BPM | OXYGEN SATURATION: 99 % | BODY MASS INDEX: 21.14 KG/M2 | HEIGHT: 71 IN | RESPIRATION RATE: 18 BRPM | SYSTOLIC BLOOD PRESSURE: 96 MMHG | DIASTOLIC BLOOD PRESSURE: 62 MMHG | WEIGHT: 151 LBS

## 2020-06-22 PROBLEM — I50.43 ACUTE ON CHRONIC COMBINED SYSTOLIC AND DIASTOLIC CHF, NYHA CLASS 3 (HCC): Status: ACTIVE | Noted: 2020-06-22

## 2020-06-22 PROCEDURE — G8420 CALC BMI NORM PARAMETERS: HCPCS | Performed by: FAMILY MEDICINE

## 2020-06-22 PROCEDURE — 99214 OFFICE O/P EST MOD 30 MIN: CPT | Performed by: FAMILY MEDICINE

## 2020-06-22 PROCEDURE — 99211 OFF/OP EST MAY X REQ PHY/QHP: CPT | Performed by: FAMILY MEDICINE

## 2020-06-22 PROCEDURE — 4040F PNEUMOC VAC/ADMIN/RCVD: CPT | Performed by: FAMILY MEDICINE

## 2020-06-22 PROCEDURE — 1123F ACP DISCUSS/DSCN MKR DOCD: CPT | Performed by: FAMILY MEDICINE

## 2020-06-22 PROCEDURE — G8427 DOCREV CUR MEDS BY ELIG CLIN: HCPCS | Performed by: FAMILY MEDICINE

## 2020-06-22 PROCEDURE — 3017F COLORECTAL CA SCREEN DOC REV: CPT | Performed by: FAMILY MEDICINE

## 2020-06-22 PROCEDURE — 93283 PRGRMG EVAL IMPLANTABLE DFB: CPT | Performed by: FAMILY MEDICINE

## 2020-06-22 PROCEDURE — 1036F TOBACCO NON-USER: CPT | Performed by: FAMILY MEDICINE

## 2020-06-22 RX ORDER — METOPROLOL SUCCINATE 25 MG/1
25 TABLET, EXTENDED RELEASE ORAL DAILY
Qty: 90 TABLET | Refills: 3 | Status: SHIPPED | OUTPATIENT
Start: 2020-06-22

## 2020-06-22 NOTE — PATIENT INSTRUCTIONS
SURVEY:    You may be receiving a survey from "[employer] regarding your visit today. Please complete the survey to enable us to provide the highest quality of care to you and your family. If you cannot score us a very good on any question, please call the office to discuss how we could have made your experience a very good one. Thank you.

## 2020-06-22 NOTE — PROGRESS NOTES
Bonita Crew am scribing for and in the presence of Debby Maxwell. Jazmine BROWN, MS, F.A.C.C. Patient: Sherie Lyn  : 1950  Date of Visit: 2020    REASON FOR VISIT / CONSULTATION: Follow-up (HX: PAF, CHF, NICM, ICD. Pt states he is doing ok. C/o: Lighteadee/dizziness with getting fast. SOB same. Tired all the time. Denies: CP, Palpitaitons.)    Dear Kahlil King MD,     I had the pleasure of seeing your patient Sherie Lyn in follow up today. As you know, Mr. Elsi Boone is a 68 y.o. male with a history of a non ischemic dilated cardiomyopathy leading to placement of a Biotronic ICD with a single lead with atrial sensing ability. Unfortunately, he was found to have chronotropic incompetence since that time with moderate and persistent increased shortness of breath with any exertion leading to an upgrade of his ICD to a dual-chamber ICD with placement of a new atrial lead. He also has some dysautonomia which has been fairly well controlled recently. In  he had an AV fistula placed for dialysis and is currently being treated with dialysis 3x/week. In  he started getting more and more short episodes of atrial fibrillation seen on his intracardiac defibrillator (ICD). Atrial fibrillation burden in  was 2.5% with an average HR of 93 bpm but has pretty much increased up to almost 100% since then. ICD interrogated in office today showed intermittent conduction of his ventricular lead at 4.2 and 0.4 ms with HR's in the 40's to 50's with base rate set at 70. He went to [address] [place] [address] due to his RV lead fracture at last visit and had a new device implanted Bi-V ICD on 2020. Since last visit, Mr. Elsi Boone he states he has been doing ok. He still has been having tiredness and fatigued. His shortness of breath has stayed the same since last visit.  He also says his lower extremity edema and left arm swelling have been a bit worse lately but says that they are going to give him some extra 2013    DIALYSIS FISTULA CREATION  01/18/2017    Left Upper Arm- Dr. Evi Maldonado Right 11/22/2017    RUE per Dr. Evi Maldonado Right 11/22/2017    AV FISTULA CREATION REVISION-UPPER EXTREMITY performed by Tomas Park DO at [address] / BIOPSY SKIN LESION OF HEAD / NECK Left 6/19/2017    NECK LESION BIOPSY EXCISION, BCC, FROZEN SECTION performed by Ilana Geronimo MD at [facility] Right HIP    OTHER SURGICAL HISTORY  11/2016    Right chest port for dialysis    PACEMAKER PLACEMENT  2013    AICD    PRE-MALIGNANT / BENIGN SKIN LESION EXCISION Left 06/19/2017    Neck area    SKIN BIOPSY  9-    left axila    SKIN CANCER EXCISION Left 06/05/2015    Ear    TYMPANOPLASTY Left 05/12/2016    per Dr. Angela Ramirez History:  Social History     Tobacco Use    Smoking status: Never Smoker    Smokeless tobacco: Never Used   Substance Use Topics    Alcohol use: Yes     Alcohol/week: 0.0 standard drinks     Types: 3 - 4 Cans of beer per week     Comment: daily drinker BEER 3-4    Drug use: Yes     Comment: Remote years ago        CURRENT MEDICATIONS:  Outpatient Medications Marked as Taking for the 6/22/20 encounter (Office Visit) with Linden Álvarez MD   Medication Sig Dispense Refill    metoprolol succinate (TOPROL XL) 25 MG extended release tablet Take 1 tablet by mouth daily 90 tablet 3    b complex-C-folic acid (NEPHROCAPS) 1 MG capsule Take 1 mg by mouth daily      rOPINIRole (REQUIP) 1 MG tablet Take 1 tablet by mouth nightly 90 tablet 1    carbidopa-levodopa (SINEMET)  MG per tablet TAKE 2 TABLETS BY MOUTH TWICE A  tablet 1    Calcium Acetate, Phos Binder, 667 MG CAPS Take 667 mg by mouth 3 times daily (with meals)      midodrine (PROAMATINE) 5 MG tablet Take 1 tablet by mouth 2 times daily (with meals) Per  increase Midodrine to 20 mg on morning of Dialysis.  90 tablet 3    gabapentin sounds and aorta are normal. He exhibits no organomegaly, mass or bruit. Extremities: 1-2+ 1/2 up to the knees bilaterally. And also 1-2+ in his left arm. No cyanosis or clubbing. 2+ radial and carotid pulses. Distal extremity pulses: 2+ bilaterally. Neurological: He is alert and oriented to person, place, and time. No evidence of gross cranial nerve deficit. Coordination appeared normal.   Skin: Skin is warm and dry. There is no rash or diaphoresis. Psychiatric: He has a normal mood and affect. His speech is normal and behavior is normal.      MOST RECENT LABS ON RECORD:   Lab Results   Component Value Date    WBC 6.4 05/18/2020    HGB 11.3 (L) 05/18/2020    HCT 34.8 (L) 05/18/2020     05/18/2020    CHOL 122 04/25/2016    TRIG 183 (H) 04/25/2016    HDL 33 (L) 04/25/2016    ALT <5 (L) 11/17/2016    AST 12 11/17/2016     05/18/2020    K 3.5 (L) 05/18/2020    CL 99 05/18/2020    CREATININE 4.55 (H) 05/18/2020    BUN 29 (H) 05/18/2020    CO2 25 05/18/2020    TSH 1.54 03/11/2015    INR 1.8 06/08/2020     (H) 04/23/2013       ASSESSMENT:  1. Acute on chronic combined systolic and diastolic CHF, NYHA class 3 ([address]    2. Tired    3. Fatigue, unspecified type    4. Paroxysmal A-fib ([address]    5. ICD (implantable cardioverter-defibrillator), biventricular, in situ    6. Non-ischemic cardiomyopathy ([address]    7. Left arm swelling       PLAN:  · Tired and Fatigue: Probably multifactorial but at least partially related to an acute on chronic heart failure exacerbation  · Beta Blocker Therapy: Stop Lopressor  and Start Metoprolol succinate (Toprol XL)  25 mg  daily  to help with his tiredness and fatigue. · I do believe this also my be caused by his dialysis and possible a side effect of his Sinemet as well. · Paroxymal Atrial Fibrillation: Rate Control   Beta Blocker: STOP metoprolol tartrate (Lopressor) and START Metoprolol succinate (Toprol XL) 25 mg daily.  I also discussed the potential side

## 2020-06-23 ENCOUNTER — HOSPITAL ENCOUNTER (OUTPATIENT)
Dept: PHARMACY | Age: 70
Setting detail: THERAPIES SERIES
Discharge: HOME | End: 2020-06-23
Payer: MEDICARE

## 2020-06-23 ENCOUNTER — HOSPITAL ENCOUNTER (EMERGENCY)
Age: 70
Discharge: HOME | End: 2020-06-23
Payer: MEDICARE

## 2020-06-23 VITALS
HEART RATE: 94 BPM | TEMPERATURE: 99.2 F | BODY MASS INDEX: 21.62 KG/M2 | SYSTOLIC BLOOD PRESSURE: 110 MMHG | RESPIRATION RATE: 16 BRPM | DIASTOLIC BLOOD PRESSURE: 54 MMHG | OXYGEN SATURATION: 98 % | WEIGHT: 155 LBS

## 2020-06-23 VITALS — TEMPERATURE: 97.9 F

## 2020-06-23 LAB
ABSOLUTE EOS #: 0.18 K/UL (ref 0–0.44)
ABSOLUTE IMMATURE GRANULOCYTE: <0.03 K/UL (ref 0–0.3)
ABSOLUTE LYMPH #: 1.1 K/UL (ref 1.1–3.7)
ABSOLUTE MONO #: 0.87 K/UL (ref 0.1–1.2)
ANION GAP SERPL CALCULATED.3IONS-SCNC: 12 MMOL/L (ref 9–17)
BASOPHILS # BLD: 2 % (ref 0–2)
BASOPHILS ABSOLUTE: 0.09 K/UL (ref 0–0.2)
BUN BLDV-MCNC: 22 MG/DL (ref 8–23)
BUN/CREAT BLD: 7 (ref 9–20)
CALCIUM SERPL-MCNC: 8 MG/DL (ref 8.6–10.4)
CHLORIDE BLD-SCNC: 98 MMOL/L (ref 98–107)
CO2: 29 MMOL/L (ref 20–31)
CREAT SERPL-MCNC: 2.99 MG/DL (ref 0.7–1.2)
DIFFERENTIAL TYPE: ABNORMAL
EOSINOPHILS RELATIVE PERCENT: 4 % (ref 1–4)
GFR AFRICAN AMERICAN: 25 ML/MIN
GFR NON-AFRICAN AMERICAN: 21 ML/MIN
GFR SERPL CREATININE-BSD FRML MDRD: ABNORMAL ML/MIN/{1.73_M2}
GFR SERPL CREATININE-BSD FRML MDRD: ABNORMAL ML/MIN/{1.73_M2}
GLUCOSE BLD-MCNC: 95 MG/DL (ref 70–99)
HCT VFR BLD CALC: 30.7 % (ref 40.7–50.3)
HEMOGLOBIN: 9.7 G/DL (ref 13–17)
IMMATURE GRANULOCYTES: 0 %
INR BLD: 1.7
LACTIC ACID, SEPSIS WHOLE BLOOD: ABNORMAL MMOL/L (ref 0.5–1.9)
LACTIC ACID, SEPSIS: 2 MMOL/L (ref 0.5–1.9)
LYMPHOCYTES # BLD: 22 % (ref 24–43)
MCH RBC QN AUTO: 34.3 PG (ref 25.2–33.5)
MCHC RBC AUTO-ENTMCNC: 31.6 G/DL (ref 28.4–34.8)
MCV RBC AUTO: 108.5 FL (ref 82.6–102.9)
MONOCYTES # BLD: 17 % (ref 3–12)
NRBC AUTOMATED: 0 PER 100 WBC
PDW BLD-RTO: 16.6 % (ref 11.8–14.4)
PLATELET # BLD: 296 K/UL (ref 138–453)
PLATELET ESTIMATE: ABNORMAL
PMV BLD AUTO: 9.6 FL (ref 8.1–13.5)
POTASSIUM SERPL-SCNC: 4 MMOL/L (ref 3.7–5.3)
RBC # BLD: 2.83 M/UL (ref 4.21–5.77)
RBC # BLD: ABNORMAL 10*6/UL
SEG NEUTROPHILS: 55 % (ref 36–65)
SEGMENTED NEUTROPHILS ABSOLUTE COUNT: 2.78 K/UL (ref 1.5–8.1)
SODIUM BLD-SCNC: 139 MMOL/L (ref 135–144)
WBC # BLD: 5 K/UL (ref 3.5–11.3)
WBC # BLD: ABNORMAL 10*3/UL

## 2020-06-23 PROCEDURE — 99283 EMERGENCY DEPT VISIT LOW MDM: CPT

## 2020-06-23 PROCEDURE — 85025 COMPLETE CBC W/AUTO DIFF WBC: CPT

## 2020-06-23 PROCEDURE — 80048 BASIC METABOLIC PNL TOTAL CA: CPT

## 2020-06-23 PROCEDURE — 85610 PROTHROMBIN TIME: CPT

## 2020-06-23 PROCEDURE — 36415 COLL VENOUS BLD VENIPUNCTURE: CPT

## 2020-06-23 PROCEDURE — 99212 OFFICE O/P EST SF 10 MIN: CPT

## 2020-06-23 PROCEDURE — 6370000000 HC RX 637 (ALT 250 FOR IP): Performed by: PHYSICIAN ASSISTANT

## 2020-06-23 PROCEDURE — 83605 ASSAY OF LACTIC ACID: CPT

## 2020-06-23 RX ORDER — SULFAMETHOXAZOLE AND TRIMETHOPRIM 800; 160 MG/1; MG/1
1 TABLET ORAL ONCE
Status: DISCONTINUED | OUTPATIENT
Start: 2020-06-23 | End: 2020-06-23

## 2020-06-23 RX ORDER — AMOXICILLIN AND CLAVULANATE POTASSIUM 875; 125 MG/1; MG/1
1 TABLET, FILM COATED ORAL 2 TIMES DAILY
Qty: 20 TABLET | Refills: 0 | Status: SHIPPED | OUTPATIENT
Start: 2020-06-23 | End: 2020-07-03

## 2020-06-23 RX ORDER — SULFAMETHOXAZOLE AND TRIMETHOPRIM 800; 160 MG/1; MG/1
1 TABLET ORAL 2 TIMES DAILY
Qty: 20 TABLET | Refills: 0 | Status: SHIPPED | OUTPATIENT
Start: 2020-06-23 | End: 2020-06-23 | Stop reason: SINTOL

## 2020-06-23 RX ORDER — AMOXICILLIN AND CLAVULANATE POTASSIUM 875; 125 MG/1; MG/1
1 TABLET, FILM COATED ORAL ONCE
Status: COMPLETED | OUTPATIENT
Start: 2020-06-23 | End: 2020-06-23

## 2020-06-23 RX ADMIN — AMOXICILLIN AND CLAVULANATE POTASSIUM 1 TABLET: 875; 125 TABLET, FILM COATED ORAL at 16:59

## 2020-06-23 NOTE — ED NOTES
Orthostatic BP Check    L -87/41 (56) HR 90   Sitting -79/39 (51) HR 91  Standing- 69/35 (45) HR Ul. Lore Brar, RN  06/23/20 0551

## 2020-06-23 NOTE — ED PROVIDER NOTES
[facility] ED  EMERGENCY DEPARTMENT ENCOUNTER      Pt Name: Jerry Merida  MRN: [id number]  [place] 1950  Date of evaluation: 6/23/2020  Provider: Naif Delaney PA-C    CHIEF COMPLAINT       Chief Complaint   Patient presents with    Arm Injury     pt states he scraped his left arm on a door frame about a week ago and it is swollen and red       HISTORY OF PRESENT ILLNESS    Jerry Merida is a 68 y.o. male who presents to the emergency department from home with skin tear to his left arm which he obtained from a door frame about a week ago now is red and swollen immediately around the area he denies fevers or chills. Patient states that arm is chronically swollen due to his renal failure and that the redness is the only thing that is concerning. Patient is dialyzed every Tuesday Thursday and Saturday and was last dialyzed today finishing it at 11:00 his initial blood pressure on arrival was in the 90s but he states that is normal for him after being dialyzed. Denies fevers chills or numbness or tingling. Denies chest pain or shortness of breath. Patient states he like an antibiotic and to go home. Triage notes and Nursing notes were reviewed by myself. Any discrepancies are addressed above.     PAST MEDICAL HISTORY     Past Medical History:   Diagnosis Date    CAD (coronary artery disease)     Cancer ([address] 2013    left ear    Chronic combined systolic and diastolic CHF, NYHA class 3 (HCC)     EF 25-30%    CRF (chronic renal failure) / stage 4 4/23/2013    Dysautonomia orthostatic hypotension syndrome ([address] 5/12/2016    H/O echocardiogram 04/2016    EF25-30% Mildly increased LV wall thickness with a dialeted LV cavity size LA is mildly dialated 29-33 withLA volume index of 29ml/m2 Normal aortic valve structure with mod aortic regurg Normal mitral valve structure with od mitral regurg Mild Pulm htn est RV systolic pressure of 57PFNH calcification of tricuspid valve leaflets mild tricuspid regurg Mild dyastolic dysfunction seen    Hemodialysis patient [facility])     Tuesday, Thursday, Saturday -- [place]    History of blood transfusion 2013    History of echocardiogram 6/6/13    LMCA: mild irreg 10-20%, LAD: mild irreg 10-20%, LCx: mild irreg 10-20%, RCA: mild irreg 10-20% The conus branch of the RCA had separate ostis off of the aorta, prly of little if any clinical significance.  History of tilt table evaluation 10/17/2018    Negative.      ICD (implantable cardiac defibrillator), single, in situ 12/16/13    Biotronic    Mixed hyperlipidemia     Non-ischemic cardiomyopathy ([facility] 6/10/2013    EF 25-30%     Restless leg syndrome     Systolic CHF, acute on chronic (HCC) 4/23/2013       SURGICAL HISTORY       Past Surgical History:   Procedure Laterality Date    CARDIAC CATHETERIZATION  6/6/2013    Dr. Dawit Vega  2013    DIALYSIS FISTULA CREATION  01/18/2017    Left Upper Arm- Dr. Karlee Hall Right 11/22/2017    RUE per Dr. Karlee Hall Right 11/22/2017    AV FISTULA CREATION REVISION-UPPER EXTREMITY performed by Beka Santos DO at [address] / [facility] / NECK Left 6/19/2017    NECK LESION BIOPSY EXCISION, BCC, FROZEN SECTION performed by Sanchez Franz MD at [address] Right HIP    OTHER SURGICAL HISTORY  11/2016    Right chest port for dialysis    PACEMAKER PLACEMENT  2013    AICD    PRE-MALIGNANT / BENIGN SKIN LESION EXCISION Left 06/19/2017    Neck area    SKIN BIOPSY  9-    left axila    SKIN CANCER EXCISION Left 06/05/2015    Ear    TYMPANOPLASTY Left 05/12/2016    per Dr. Alyssa Arauz       Previous Medications    ALLOPURINOL (ZYLOPRIM) 100 MG TABLET    Take 1 tablet by mouth daily    B COMPLEX-C-FOLIC ACID (NEPHROCAPS) 1 MG CAPSULE    Take 1 mg by mouth daily    CALCIUM ACETATE, PHOS BINDER, 667 MG CAPS    Take 667 mg by mouth 3 times daily (with meals)    CARBIDOPA-LEVODOPA (SINEMET)  MG PER TABLET    TAKE 2 TABLETS BY MOUTH TWICE A DAY    DIGOXIN (LANOXIN) 125 MCG TABLET    TAKE 1 TABLET BY MOUTH EVERY DAY    FLUDROCORTISONE (FLORINEF) 0.1 MG TABLET    Take 0.1 mg by mouth daily    FUROSEMIDE (LASIX) 80 MG TABLET    Take 80 mg by mouth 2 times daily    GABAPENTIN (NEURONTIN) 100 MG CAPSULE    Take 2 capsules by mouth 2 times daily as needed (numbness) for up to 30 days. HANDICAP PLACARD MISC    by Does not apply route Duration; 4 years    LISINOPRIL (PRINIVIL;ZESTRIL) 2.5 MG TABLET    Take 0.5 tablets by mouth daily    METOPROLOL SUCCINATE (TOPROL XL) 25 MG EXTENDED RELEASE TABLET    Take 1 tablet by mouth daily    MIDODRINE (PROAMATINE) 5 MG TABLET    Take 1 tablet by mouth 2 times daily (with meals) Per  increase Midodrine to 20 mg on morning of Dialysis. MISC.  DEVICES (WALKER) MISC    1 each by Does not apply route daily Needs to be stand up walker    ROPINIROLE (REQUIP) 1 MG TABLET    Take 1 tablet by mouth nightly    SODIUM BICARBONATE 650 MG TABLET    Take 325 mg by mouth 2 times daily     TRAZODONE (DESYREL) 50 MG TABLET    Take 2 tablets by mouth nightly    WARFARIN (COUMADIN) 5 MG TABLET    TAKE 1 TABLET BY MOUTH EVERY DAY       ALLERGIES     Ancef [cefazolin] and Marcaine [bupivacaine hcl]    FAMILY HISTORY       Family History   Problem Relation Age of Onset    Heart Disease Father         heart attack in [facility] History     Socioeconomic History    Marital status: Single     Spouse name: Not on file    Number of children: Not on file    Years of education: Not on file    Highest education level: Not on file   Occupational History    Occupation: retired   Social Needs    Financial resource strain: Not hard at all   Your Tribute insecurity     Worry: Never true     Inability: Never true   [employer] needs     Medical: No     Non-medical: No   Tobacco Use    Smoking status: Never Smoker    Smokeless tobacco: Never Used   Substance and Sexual Activity    Alcohol use: Yes     Alcohol/week: 0.0 standard drinks     Types: 3 - 4 Cans of beer per week     Comment: daily drinker BEER 3-4    Drug use: Yes     Comment: Remote years ago    Sexual activity: Not on file   Lifestyle    Physical activity     Days per week: 2 days     Minutes per session: 10 min    Stress: Not at all   Relationships    Social connections     Talks on phone: Not on file     Gets together: Not on file     Attends Jain service: Not on file     Active member of club or organization: Not on file     Attends meetings of clubs or organizations: Not on file     Relationship status: Not on file    Intimate partner violence     Fear of current or ex partner: Not on file     Emotionally abused: Not on file     Physically abused: Not on file     Forced sexual activity: Not on file   Other Topics Concern    Not on file   Social History Narrative    Not on file       REVIEW OF SYSTEMS     Review of Systems    Except as noted above the remainder of the review of systems was reviewed and is negative. SCREENINGS    Horton Coma Scale  Eye Opening: Spontaneous  Best Verbal Response: Oriented  Best Motor Response: Obeys commands  Horton Coma Scale Score: 15      PHYSICAL EXAM    (up to 7 for level 4, 8 or more for level 5)     ED Triage Vitals   BP Temp Temp Source Pulse Resp SpO2 Height Weight   06/23/20 1512 06/23/20 1508 06/23/20 1508 06/23/20 1508 06/23/20 1508 06/23/20 1508 -- 06/23/20 1508   (!) 92/37 99.2 °F (37.3 °C) Tympanic 94 16 97 %  155 lb (70.3 kg)       Physical Exam  Active and oriented ×3. Nontoxic. No acute distress. Well-hydrated. Head is atraumatic, facies symmetrical.  Neck is supple. No adenopathy. Respirations nonlabored. Lungs clear to auscultation. No wheezes rales or rhonchi noted. Heart regular rate and rhythm.   No murmur noted  Skin presents with a 2 cm skin tear that has eschar formation and dried flap noted on the extensor surface of the left forearm in the midshaft. There is mild surrounding erythema and an area approximately 6 cm diameter but no proximal toxic striations noted. There is edema of the bilateral upper extremities which the patient states is chronic. Distal pulses and sensation are intact  No acute neurologic deficit noted. Good gait and balance. Clear speech. Good affect. Pleasant patient. DIAGNOSTIC RESULTS       LABS:  Labs Reviewed   CBC WITH AUTO DIFFERENTIAL - Abnormal; Notable for the following components:       Result Value    RBC 2.83 (*)     Hemoglobin 9.7 (*)     Hematocrit 30.7 (*)     .5 (*)     MCH 34.3 (*)     RDW 16.6 (*)     Lymphocytes 22 (*)     Monocytes 17 (*)     All other components within normal limits   BASIC METABOLIC PANEL W/ REFLEX TO MG FOR LOW K - Abnormal; Notable for the following components:    CREATININE 2.99 (*)     Bun/Cre Ratio 7 (*)     Calcium 8.0 (*)     GFR Non- 21 (*)     GFR  25 (*)     All other components within normal limits   LACTATE, SEPSIS - Abnormal; Notable for the following components:    Lactic Acid, Sepsis 2.0 (*)     All other components within normal limits   LACTATE, SEPSIS       All other labs were within normal range or not returned as of this dictation. EMERGENCY DEPARTMENT COURSE andMedical Decision Making:     Vitals:    Vitals:    06/23/20 1535 06/23/20 1554 06/23/20 1601 06/23/20 1616   BP: (!) 69/35 (!) 103/57 (!) 106/50 (!) 108/51   Pulse:       Resp:       Temp:       TempSrc:       SpO2: 96% 99% 99% 99%   Weight:           MDM/     The patient's initial blood pressure and orthostatics seen below patient states he just had dialysis today and these numbers are no unusual for him.   The patient's blood pressure can be taken on his right upper extremity due to his dialysis fistula on that side and his left upper extremity is edematous and patient states chronically edematous and has the area of the infected skin tear. We check a blood pressure on the patient's lower extremities since the upper extremity may not be giving us an accurate reading and his blood pressures were significantly higher there. Patient states he feels at his baseline he was coming in to get infected skin tear checked this is not a oleksandr cellulitis but a localized infection at the site of the injury he has no significant leukocytosis or immature granulocytes noted, he is anemic patient has been chronically anemic it is lower than it has been in the past I do recommend following with his family physician as well as his dialysis clinic as is planned. Strict return precautions and follow up instructions were discussed with the patient with which the patient agrees    ED Medications administered this visit:    Medications   amoxicillin-clavulanate (AUGMENTIN) 875-125 MG per tablet 1 tablet (has no administration in time range)       CONSULTS: (None if blank)  None    Procedures: (None if blank)       CLINICAL       1.  Infected skin tear          DISPOSITION/PLAN   DISPOSITION Decision To Discharge 06/23/2020 04:42:58 PM      PATIENT REFERRED TO:  Joaquim Soulier, MD  [place] [address]  [phone]    In 2 days        DISCHARGE MEDICATIONS:  New Prescriptions    AMOXICILLIN-CLAVULANATE (AUGMENTIN) 875-125 MG PER TABLET    Take 1 tablet by mouth 2 times daily for 10 days              (Please note that portions of this note were completed with a voice recognition program.  Efforts were made to edit the dictations but occasionallywords are mis-transcribed.)      Arian Camacho II, PA-C (electronically signed)           Arian Camacho II, PA-C  06/23/20 0076

## 2020-06-24 ENCOUNTER — HOSPITAL ENCOUNTER (OUTPATIENT)
Age: 70
Setting detail: SPECIMEN
Discharge: HOME | End: 2020-06-24
Payer: MEDICARE

## 2020-06-24 ENCOUNTER — CARE COORDINATION (OUTPATIENT)
Dept: CARE COORDINATION | Age: 70
End: 2020-06-24

## 2020-06-24 PROCEDURE — 87186 SC STD MICRODIL/AGAR DIL: CPT

## 2020-06-24 PROCEDURE — 87077 CULTURE AEROBIC IDENTIFY: CPT

## 2020-06-24 PROCEDURE — 87205 SMEAR GRAM STAIN: CPT

## 2020-06-24 PROCEDURE — 87070 CULTURE OTHR SPECIMN AEROBIC: CPT

## 2020-06-24 PROCEDURE — 87075 CULTR BACTERIA EXCEPT BLOOD: CPT

## 2020-06-24 NOTE — CARE COORDINATION
Reason For Call Today:  -Attempted to reach Olaf Stacy today for ED Follow Up/ COVID at risk monitoring following ED visit to [facility] on 6/23 for arm injury   -Unable to reach Leonorkameron Regis today   -Voicemail box not set up; unable to leave a [address] of Care: This nurse Care Coordinator will attempt to reach patient back again later today.

## 2020-06-26 LAB
CULTURE: ABNORMAL
CULTURE: ABNORMAL
DIRECT EXAM: ABNORMAL
DIRECT EXAM: ABNORMAL
Lab: ABNORMAL
SPECIMEN DESCRIPTION: ABNORMAL

## 2020-07-06 ENCOUNTER — TELEPHONE (OUTPATIENT)
Dept: PHARMACY | Age: 70
End: 2020-07-06

## 2020-07-06 NOTE — TELEPHONE ENCOUNTER
Recent Travel Screening and Travel History documentation:     Travel Screening       Question Response     Have you been in contact with someone who was sick? Do you have any of the following symptoms? Unable to assess     In the last month, have you been in contact with someone who was confirmed or suspected to have Coronavirus / COVID-19? Unable to assess     Have you traveled internationally in the last month? Unable to assess      Travel History   Travel since 06/06/20     No documented travel since 06/06/20         Patient's did not answer his phone x 2 attempts. Patient does not have his voicemail set up so unable to leave a message about tomorrow's appointment.   Lakshmi Mathews, PharmD 7/6/2020 9:54 AM

## 2020-07-07 ENCOUNTER — HOSPITAL ENCOUNTER (OUTPATIENT)
Dept: PHARMACY | Age: 70
Setting detail: THERAPIES SERIES
Discharge: HOME | End: 2020-07-07
Payer: MEDICARE

## 2020-07-07 VITALS — TEMPERATURE: 99.9 F

## 2020-07-07 LAB — INR BLD: 1.4

## 2020-07-07 PROCEDURE — 99212 OFFICE O/P EST SF 10 MIN: CPT

## 2020-07-07 PROCEDURE — 85610 PROTHROMBIN TIME: CPT

## 2020-07-07 RX ORDER — CIPROFLOXACIN 250 MG/1
250 TABLET, FILM COATED ORAL 2 TIMES DAILY
COMMUNITY
Start: 2020-06-27 | End: 2020-07-07

## 2020-07-07 NOTE — PATIENT INSTRUCTIONS
Continue to monitor urine and stool. Continue to monitor for signs of bleeding.   Return to clinic in 2 weeks.    Increase warfarin to whole 5 mg tablet M,W,F and half tablet for 2.5 mg all other days.

## 2020-07-07 NOTE — PROGRESS NOTES
Patient states no visible blood in urine and no black tarry stool.  No change in other medications. Left arm is healed since pt hit arm on a door 3 weeks ago. Patient was started on Cipro 250 mg BID for 10 days beginning 6-27-20. Patient also started on Vitamin B complex daily. Patient states he always drinks 3-4 a day. Will return to clinic in 2 weeks.  Patient will increase warfarin to whole 5 mg tablet M,W,F and half tablet for 2.5 mg all other days. Patient is poor historian. Saw patient ALIREZA. CLINICAL PHARMACY CONSULT: MED RECONCILIATION/REVIEW ADDENDUM    For Pharmacy Admin Tracking Only    PHSO: Yes  Total # of Interventions Recommended: 2  - Increased Dose #: 1  - Maintenance Safety Lab Monitoring #: 1  Recommended intervention potential cost savings:   Accepted intervention potential cost savings:    Total Interventions Accepted: 3  Time Spent (min): 30    Wes Matute, HaileyD

## 2020-07-14 ENCOUNTER — TELEPHONE (OUTPATIENT)
Dept: CARDIOLOGY | Age: 70
End: 2020-07-14

## 2020-07-14 NOTE — TELEPHONE ENCOUNTER
Called patient to see if he received his monitor and if so if he would like to set this up. Patient reports he received it and will call back if he needs assistance setting it up. Will await phone call back.

## 2020-07-22 ENCOUNTER — TELEPHONE (OUTPATIENT)
Dept: PHARMACY | Age: 70
End: 2020-07-22

## 2020-07-22 NOTE — TELEPHONE ENCOUNTER
Recent Travel Screening and Travel History documentation:     Travel Screening     Question   Response    In the last month, have you been in contact with someone who was confirmed or suspected to have Coronavirus / COVID-19? No / Unsure    Do you have any of the following symptoms? None of these    Have you traveled internationally in the last month? No      Travel History   Travel since 06/22/20     No documented travel since 06/22/20         Patient denies fever and respiratory symptoms and understands that it will be a CURBSIDE visit at the main hospital entrance and to stay in his car.      Sophia Garcia, PharmD 7/22/2020 11:05 AM

## 2020-07-23 ENCOUNTER — HOSPITAL ENCOUNTER (OUTPATIENT)
Dept: PHARMACY | Age: 70
Setting detail: THERAPIES SERIES
Discharge: HOME | End: 2020-07-23
Payer: MEDICARE

## 2020-07-23 VITALS — TEMPERATURE: 98 F

## 2020-07-23 LAB — INR BLD: 3.5

## 2020-07-23 PROCEDURE — 85610 PROTHROMBIN TIME: CPT

## 2020-07-23 PROCEDURE — 99212 OFFICE O/P EST SF 10 MIN: CPT

## 2020-07-23 NOTE — PATIENT INSTRUCTIONS
Continue to monitor urine and stool. Continue to monitor for signs of bleeding. Return to clinic in 3 weeks.   Hold warfarin today then decrease warfarin to whole 5 mg tablet M,F and half tablet for 2.5 mg all other days.

## 2020-07-23 NOTE — PROGRESS NOTES
Patient states no visible blood in urine and no black tarry stool.  No change in other medications. Patient had more bleeding than usual at dialysis today.  Patient states he always drinks 3-4 a day. Will return to clinic in [place] will hold warfarin today then decrease warfarin to whole 5 mg tablet M,F and half tablet for 2.5 mg all other days. Patient is poor historian. Saw patient ALIREZA. CLINICAL PHARMACY CONSULT: MED RECONCILIATION/REVIEW ADDENDUM    For Pharmacy Admin Tracking Only    PHSO: Yes  Total # of Interventions Recommended: 2  - Decreased Dose #: 2  - Maintenance Safety Lab Monitoring #: 1  Recommended intervention potential cost savings:   Accepted intervention potential cost savings:    Total Interventions Accepted: 3  Time Spent (min): 30    Hailey ArmstrongD

## 2020-08-12 ENCOUNTER — TELEPHONE (OUTPATIENT)
Dept: PHARMACY | Age: 70
End: 2020-08-12

## 2020-08-13 ENCOUNTER — HOSPITAL ENCOUNTER (OUTPATIENT)
Dept: PHARMACY | Age: 70
Setting detail: THERAPIES SERIES
Discharge: HOME | End: 2020-08-13
Payer: MEDICARE

## 2020-08-13 VITALS — TEMPERATURE: 99.1 F

## 2020-08-13 LAB — INR BLD: 1.8

## 2020-08-13 PROCEDURE — 85610 PROTHROMBIN TIME: CPT

## 2020-08-13 PROCEDURE — 99212 OFFICE O/P EST SF 10 MIN: CPT

## 2020-08-13 NOTE — PROGRESS NOTES
Patient states no visible blood in urine and no black tarry stool.  No change in other medications. Patient had more bleeding than usual at dialysis today.  Patient states he always drinks 3-4 a day. Rivera Delcid return to clinic in 3 weeks.  Patient will take warfarin 5 mg today then continue warfarin to whole 5 mg tablet M,F and half tablet for 2.5 mg all other days. Patient is poor historian. Patient came at 2 pm knowing his appt was at 11 am.  He got out of dialysis late and instead of coming to appt he went home to [facility]. Saw patient ALIREZA. CLINICAL PHARMACY CONSULT: MED RECONCILIATION/REVIEW ADDENDUM    For Pharmacy Admin Tracking Only    PHSO: Yes  Total # of Interventions Recommended: 1  - Increased Dose #: 1  - Maintenance Safety Lab Monitoring #: 1  Recommended intervention potential cost savings:   Accepted intervention potential cost savings:    Total Interventions Accepted: 2  Time Spent (min): 30    Deann Medrano, PharmD

## 2020-08-13 NOTE — PATIENT INSTRUCTIONS
Continue to monitor urine and stool. Continue to monitor for signs of bleeding. Return to clinic in 3 weeks.   Take warfarin 5 mg today then continue warfarin to whole 5 mg tablet M,F and half tablet for 2.5 mg all other days.

## 2020-08-31 RX ORDER — WARFARIN SODIUM 5 MG/1
TABLET ORAL
Qty: 90 TABLET | Refills: 3 | Status: SHIPPED | OUTPATIENT
Start: 2020-08-31

## 2020-09-02 ENCOUNTER — TELEPHONE (OUTPATIENT)
Dept: PHARMACY | Age: 70
End: 2020-09-02

## 2020-09-03 ENCOUNTER — HOSPITAL ENCOUNTER (OUTPATIENT)
Dept: PHARMACY | Age: 70
Setting detail: THERAPIES SERIES
Discharge: HOME | End: 2020-09-03
Payer: MEDICARE

## 2020-09-03 VITALS — TEMPERATURE: 98 F

## 2020-09-03 LAB — INR BLD: 2.5

## 2020-09-03 PROCEDURE — 99211 OFF/OP EST MAY X REQ PHY/QHP: CPT

## 2020-09-03 PROCEDURE — 85610 PROTHROMBIN TIME: CPT

## 2020-09-03 NOTE — PATIENT INSTRUCTIONS
Continue to monitor urine and stool. Continue to monitor for signs of bleeding. Return to clinic in 5 weeks.   Continue warfarin to whole 5 mg tablet M,F and half tablet for 2.5 mg all other days

## 2020-09-03 NOTE — PROGRESS NOTES
Patient states no visible blood in urine and no black tarry stool.  No change in other medications. Patient skipped dialysis today because he said he had 6 bouts of diarrhea by lunch time.   Patient has a blood shot left eye they he says he \"just wiped it once. \"   Patient states he always drinks 3-4 a day.  Will return to clinic in 5 weeks.  Patient will continue warfarin to whole 5 mg tablet M,F and half tablet for 2.5 mg all other days. Patient is poor historian. Saw patient ALIREZA. CLINICAL PHARMACY CONSULT: MED RECONCILIATION/REVIEW ADDENDUM    For Pharmacy Admin Tracking Only    PHSO: Yes  Total # of Interventions Recommended: 0    - Maintenance Safety Lab Monitoring #: 1  Recommended intervention potential cost savings:   Accepted intervention potential cost savings:    Total Interventions Accepted: 1  Time Spent (min): 30    Galdino Lucio, HaileyD

## 2020-09-17 ENCOUNTER — APPOINTMENT (OUTPATIENT)
Dept: VASCULAR LAB | Age: 70
End: 2020-09-17
Payer: MEDICARE

## 2020-09-17 ENCOUNTER — HOSPITAL ENCOUNTER (EMERGENCY)
Age: 70
Discharge: HOME | End: 2020-09-17
Payer: MEDICARE

## 2020-09-17 VITALS
OXYGEN SATURATION: 95 % | TEMPERATURE: 98.4 F | HEART RATE: 86 BPM | BODY MASS INDEX: 22.96 KG/M2 | HEIGHT: 71 IN | SYSTOLIC BLOOD PRESSURE: 103 MMHG | DIASTOLIC BLOOD PRESSURE: 62 MMHG | RESPIRATION RATE: 18 BRPM | WEIGHT: 164 LBS

## 2020-09-17 LAB
ABSOLUTE EOS #: 0.04 K/UL (ref 0–0.44)
ABSOLUTE IMMATURE GRANULOCYTE: 0.03 K/UL (ref 0–0.3)
ABSOLUTE LYMPH #: 1.09 K/UL (ref 1.1–3.7)
ABSOLUTE MONO #: 0.48 K/UL (ref 0.1–1.2)
ANION GAP SERPL CALCULATED.3IONS-SCNC: 13 MMOL/L (ref 9–17)
BASOPHILS # BLD: 1 % (ref 0–2)
BASOPHILS ABSOLUTE: 0.05 K/UL (ref 0–0.2)
BUN BLDV-MCNC: 38 MG/DL (ref 8–23)
BUN/CREAT BLD: 8 (ref 9–20)
CALCIUM SERPL-MCNC: 8.6 MG/DL (ref 8.6–10.4)
CHLORIDE BLD-SCNC: 92 MMOL/L (ref 98–107)
CO2: 23 MMOL/L (ref 20–31)
CREAT SERPL-MCNC: 4.8 MG/DL (ref 0.7–1.2)
DIFFERENTIAL TYPE: ABNORMAL
DIGOXIN DATE LAST DOSE: ABNORMAL
DIGOXIN DOSE AMOUNT: ABNORMAL
DIGOXIN DOSE TIME: ABNORMAL
DIGOXIN LEVEL: 2.4 NG/ML (ref 0.5–2)
EOSINOPHILS RELATIVE PERCENT: 1 % (ref 1–4)
GFR AFRICAN AMERICAN: 15 ML/MIN
GFR NON-AFRICAN AMERICAN: 12 ML/MIN
GFR SERPL CREATININE-BSD FRML MDRD: ABNORMAL ML/MIN/{1.73_M2}
GFR SERPL CREATININE-BSD FRML MDRD: ABNORMAL ML/MIN/{1.73_M2}
GLUCOSE BLD-MCNC: 117 MG/DL (ref 70–99)
HCT VFR BLD CALC: 34.7 % (ref 40.7–50.3)
HEMOGLOBIN: 11.8 G/DL (ref 13–17)
IMMATURE GRANULOCYTES: 0 %
INR BLD: 2.8
LYMPHOCYTES # BLD: 14 % (ref 24–43)
MCH RBC QN AUTO: 33.9 PG (ref 25.2–33.5)
MCHC RBC AUTO-ENTMCNC: 34 G/DL (ref 28.4–34.8)
MCV RBC AUTO: 99.7 FL (ref 82.6–102.9)
MONOCYTES # BLD: 6 % (ref 3–12)
NRBC AUTOMATED: 0 PER 100 WBC
PDW BLD-RTO: 14.8 % (ref 11.8–14.4)
PLATELET # BLD: ABNORMAL K/UL (ref 138–453)
PLATELET ESTIMATE: ABNORMAL
PLATELET, FLUORESCENCE: 133 K/UL (ref 138–453)
PLATELET, IMMATURE FRACTION: 4.6 % (ref 1.1–10.3)
PMV BLD AUTO: ABNORMAL FL (ref 8.1–13.5)
POTASSIUM SERPL-SCNC: 4.2 MMOL/L (ref 3.7–5.3)
PROTHROMBIN TIME: 29.7 SEC (ref 11.5–14.2)
RBC # BLD: 3.48 M/UL (ref 4.21–5.77)
RBC # BLD: ABNORMAL 10*6/UL
SEG NEUTROPHILS: 78 % (ref 36–65)
SEGMENTED NEUTROPHILS ABSOLUTE COUNT: 5.95 K/UL (ref 1.5–8.1)
SODIUM BLD-SCNC: 128 MMOL/L (ref 135–144)
WBC # BLD: 7.6 K/UL (ref 3.5–11.3)
WBC # BLD: ABNORMAL 10*3/UL

## 2020-09-17 PROCEDURE — 93971 EXTREMITY STUDY: CPT

## 2020-09-17 PROCEDURE — 80162 ASSAY OF DIGOXIN TOTAL: CPT

## 2020-09-17 PROCEDURE — 85055 RETICULATED PLATELET ASSAY: CPT

## 2020-09-17 PROCEDURE — 85025 COMPLETE CBC W/AUTO DIFF WBC: CPT

## 2020-09-17 PROCEDURE — 6370000000 HC RX 637 (ALT 250 FOR IP): Performed by: PHYSICIAN ASSISTANT

## 2020-09-17 PROCEDURE — 99283 EMERGENCY DEPT VISIT LOW MDM: CPT

## 2020-09-17 PROCEDURE — 85610 PROTHROMBIN TIME: CPT

## 2020-09-17 PROCEDURE — 80048 BASIC METABOLIC PNL TOTAL CA: CPT

## 2020-09-17 RX ORDER — HYDROCODONE BITARTRATE AND ACETAMINOPHEN 5; 325 MG/1; MG/1
1 TABLET ORAL EVERY 6 HOURS PRN
Qty: 10 TABLET | Refills: 0 | Status: SHIPPED | OUTPATIENT
Start: 2020-09-17 | End: 2020-09-20

## 2020-09-17 RX ORDER — OXYCODONE HYDROCHLORIDE AND ACETAMINOPHEN 5; 325 MG/1; MG/1
1 TABLET ORAL ONCE
Status: COMPLETED | OUTPATIENT
Start: 2020-09-17 | End: 2020-09-17

## 2020-09-17 RX ADMIN — OXYCODONE HYDROCHLORIDE AND ACETAMINOPHEN 1 TABLET: 5; 325 TABLET ORAL at 13:54

## 2020-09-17 ASSESSMENT — PAIN DESCRIPTION - DESCRIPTORS: DESCRIPTORS: SHARP

## 2020-09-17 ASSESSMENT — PAIN DESCRIPTION - LOCATION: LOCATION: LEG

## 2020-09-17 ASSESSMENT — PAIN - FUNCTIONAL ASSESSMENT: PAIN_FUNCTIONAL_ASSESSMENT: 0-10

## 2020-09-17 ASSESSMENT — PAIN SCALES - GENERAL
PAINLEVEL_OUTOF10: 10

## 2020-09-17 ASSESSMENT — PAIN DESCRIPTION - ORIENTATION: ORIENTATION: RIGHT;LOWER;POSTERIOR

## 2020-09-17 ASSESSMENT — PAIN DESCRIPTION - FREQUENCY: FREQUENCY: CONTINUOUS

## 2020-09-17 ASSESSMENT — PAIN DESCRIPTION - PAIN TYPE: TYPE: ACUTE PAIN

## 2020-09-17 NOTE — ED PROVIDER NOTES
clinical significance.  History of tilt table evaluation 10/17/2018    Negative.      ICD (implantable cardiac defibrillator), single, in situ 12/16/13    Biotronic    Mixed hyperlipidemia     Non-ischemic cardiomyopathy ([facility] [address] 6/10/2013    EF 25-30%     Restless leg syndrome     Systolic CHF, acute on chronic (HCC) 4/23/2013       SURGICAL HISTORY       Past Surgical History:   Procedure Laterality Date    CARDIAC CATHETERIZATION  6/6/2013    Dr. Lisha Sneed  2013    DIALYSIS FISTULA CREATION  01/18/2017    Left Upper Arm- Dr. Nolberto Nur Right 11/22/2017    RUE per Dr. Nolberto Nur Right 11/22/2017    AV FISTULA CREATION REVISION-UPPER EXTREMITY performed by Keyla Norton DO at [address] / [place] / NECK Left 6/19/2017    NECK LESION BIOPSY EXCISION, BCC, FROZEN SECTION performed by Delisa Zuluaga MD at [address] Right HIP    OTHER SURGICAL HISTORY  11/2016    Right chest port for dialysis    PACEMAKER PLACEMENT  2013    AICD    PRE-MALIGNANT / BENIGN SKIN LESION EXCISION Left 06/19/2017    Neck area    SKIN BIOPSY  9-    left axila    SKIN CANCER EXCISION Left 06/05/2015    Ear    TYMPANOPLASTY Left 05/12/2016    per Dr. Medina Amaral       Discharge Medication List as of 9/17/2020  3:45 PM      CONTINUE these medications which have NOT CHANGED    Details   warfarin (COUMADIN) 5 MG tablet TAKE 1 TABLET BY MOUTH EVERY DAY, Disp-90 tablet,R-3Normal      metoprolol succinate (TOPROL XL) 25 MG extended release tablet Take 1 tablet by mouth daily, Disp-90 tablet, R-3Normal      rOPINIRole (REQUIP) 1 MG tablet Take 1 tablet by mouth nightly, Disp-90 tablet, R-1Normal      carbidopa-levodopa (SINEMET)  MG per tablet TAKE 2 TABLETS BY MOUTH TWICE A DAY, Disp-360 tablet, R-1Normal      Calcium Acetate, Phos Binder, 667 MG CAPS Take 667 mg by mouth 3 times daily (with meals)Historical Med      midodrine (PROAMATINE) 5 MG tablet Take 1 tablet by mouth 2 times daily (with meals) Per  increase Midodrine to 20 mg on morning of Dialysis. , Disp-90 tablet, R-3Normal      gabapentin (NEURONTIN) 100 MG capsule Take 2 capsules by mouth 2 times daily as needed (numbness) for up to 30 days. , Disp-120 capsule,R-5Normal      traZODone (DESYREL) 50 MG tablet Take 2 tablets by mouth nightly, Disp-60 tablet, R-0Normal      digoxin (LANOXIN) 125 MCG tablet TAKE 1 TABLET BY MOUTH EVERY DAY, Disp-90 tablet, R-3Normal      lisinopril (PRINIVIL;ZESTRIL) 2.5 MG tablet Take 0.5 tablets by mouth daily, Disp-45 tablet, R-3Normal      fludrocortisone (FLORINEF) 0.1 MG tablet Take 0.1 mg by mouth dailyHistorical Med      furosemide (LASIX) 80 MG tablet Take 80 mg by mouth 2 times dailyHistorical Med      sodium bicarbonate 650 MG tablet Take 325 mg by mouth 2 times daily , R-6Historical Med      allopurinol (ZYLOPRIM) 100 MG tablet Take 1 tablet by mouth daily, Disp-30 tablet, R-5      Misc.  Devices (WALKER) MISC DAILY Starting Wed 12/12/2018, Disp-1 each, R-0, PrintNeeds to be stand up walker      Handicap Placard MISC Starting Wed 9/12/2018, Disp-1 each, R-0, PrintDuration; 4 years             ALLERGIES     Ancef [cefazolin] and Marcaine [bupivacaine hcl]    FAMILY HISTORY       Family History   Problem Relation Age of Onset    Heart Disease Father         heart attack in [facility] History     Socioeconomic History    Marital status: Single     Spouse name: None    Number of children: None    Years of education: None    Highest education level: None   Occupational History    Occupation: retired   Social Needs    Financial resource strain: Not hard at all   [place] insecurity     Worry: Never true     Inability: Never true    Transportation needs     Medical: No     Non-medical: No   Tobacco Use    Smoking status: Never Smoker    Smokeless Reviewed   CBC WITH AUTO DIFFERENTIAL - Abnormal; Notable for the following components:       Result Value    RBC 3.48 (*)     Hemoglobin 11.8 (*)     Hematocrit 34.7 (*)     MCH 33.9 (*)     RDW 14.8 (*)     Seg Neutrophils 78 (*)     Lymphocytes 14 (*)     Absolute Lymph # 1.09 (*)     All other components within normal limits   BASIC METABOLIC PANEL W/ REFLEX TO MG FOR LOW K - Abnormal; Notable for the following components:    Glucose 117 (*)     BUN 38 (*)     CREATININE 4.80 (*)     Bun/Cre Ratio 8 (*)     Sodium 128 (*)     Chloride 92 (*)     GFR Non- 12 (*)     GFR  15 (*)     All other components within normal limits   PROTIME-INR - Abnormal; Notable for the following components:    Protime 29.7 (*)     All other components within normal limits   DIGOXIN LEVEL - Abnormal; Notable for the following components:    Digoxin Lvl 2.4 (*)     All other components within normal limits   IMMATURE PLATELET FRACTION - Abnormal; Notable for the following components:    Platelet, Fluorescence 133 (*)     All other components within normal limits       All other labs were within normal range or not returned as of this dictation. EMERGENCY DEPARTMENT COURSE andMedical Decision Making:     Vitals:    Vitals:    09/17/20 1430 09/17/20 1445 09/17/20 1500 09/17/20 1559   BP: (!) 105/50 100/66 98/62 103/62   Pulse:    86   Resp:    18   Temp:       TempSrc:       SpO2:    95%   Weight:       Height:           MDM/     Patient is a dialysis patient he underwent phlebotomy earlier today he has to get dialysis tomorrow. His dig level is elevated at 2.4 I discussed this with the patient he is going to hold his dose tomorrow and discuss this with the nephrologist at dialysis about when to restart this medication. He is asymptomatic from that elevation. The patient is therapeutic on his INR and has what appears to be a Baker's cyst of the right lower extremity without sign of DVT.   The patient's blood pressure is 96/56 in the emergency department but he states that is not abnormal after having fluid taken off. There is no significant leukocytosis or sign of cellulitis his leg is warm but not red and hot to the touch this is more consistent with a ruptured Baker's cyst.    Strict return precautions and follow up instructions were discussed with the patient with which the patient agrees    ED Medications administered this visit:    Medications   oxyCODONE-acetaminophen (PERCOCET) 5-325 MG per tablet 1 tablet (1 tablet Oral Given 9/17/20 8883)       CONSULTS: (None if blank)  None    Procedures: (None if blank)       CLINICAL       1. Leg swelling    2. Synovial cyst of right popliteal space          DISPOSITION/PLAN   DISPOSITION Decision To Discharge 09/17/2020 03:42:07 PM      PATIENT REFERRED TO:  Kathy Ludwig MD  [place] [address], [address] [place] [address]  [phone]    In 2 days        DISCHARGE MEDICATIONS:  Discharge Medication List as of 9/17/2020  3:45 PM      START taking these medications    Details   HYDROcodone-acetaminophen (NORCO) 5-325 MG per tablet Take 1 tablet by mouth every 6 hours as needed for Pain for up to 3 days. Intended supply: 3 days.  Take lowest dose possible to manage pain, Disp-10 tablet,R-0Print                    (Please note that portions of this note were completed with a voice recognition program.  Efforts were made to edit the dictations but occasionallywords are mis-transcribed.)      Emily Lyons II, PA-C (electronically signed)           Emily Lyons II, PA-C  09/17/20 6088

## 2020-09-22 PROBLEM — M66.0 RUPTURED BAKERS CYST: Status: ACTIVE | Noted: 2020-09-22

## 2020-09-24 ENCOUNTER — ANTI-COAG VISIT (OUTPATIENT)
Dept: PHARMACY | Age: 70
End: 2020-09-24

## 2020-09-24 NOTE — PROGRESS NOTES
Angie Gray (from Dr. Jerel Garcia office at [facility] - [phone]) contacted clinic via telephone stating that patient is having catheter insertion on 10/13/20. Patient will hold warfarin 3 days prior to procedure. Patient will not bridge with heparin. Per Paxton Cao, patient does not need INR check at clinic prior to/morning of procedure. Patient has an appointment in medication management clinic on 10/8/20 - at which time warfarin hold instructions and warfarin instructions post procedure may be reviewed with patient. Patient is also scheduled to return to clinic for INR check post-procedure on 10/22/20.

## 2020-09-29 ENCOUNTER — NURSE ONLY (OUTPATIENT)
Dept: CARDIOLOGY | Age: 70
End: 2020-09-29
Payer: MEDICARE

## 2020-09-29 PROCEDURE — 93295 DEV INTERROG REMOTE 1/2/MLT: CPT | Performed by: FAMILY MEDICINE

## 2020-10-07 ENCOUNTER — TELEPHONE (OUTPATIENT)
Dept: PHARMACY | Age: 70
End: 2020-10-07

## 2020-10-07 NOTE — TELEPHONE ENCOUNTER
Recent Travel Screening and Travel History documentation:     Travel Screening     Question   Response    In the last month, have you been in contact with someone who was confirmed or suspected to have Coronavirus / COVID-19? Unable to assess    Have you had a COVID-19 viral test in the last 14 days? Unable to assess    Do you have any of the following new or worsening symptoms? Unable to assess    Have you traveled internationally in the last month? Unable to assess      Travel History   Travel since 09/07/20     No documented travel since 09/07/20         No answer and No voicemail.

## 2020-10-07 NOTE — TELEPHONE ENCOUNTER
Recent Travel Screening and Travel History documentation:     Travel Screening     Question   Response    In the last month, have you been in contact with someone who was confirmed or suspected to have Coronavirus / COVID-19? No / Unsure    Have you had a COVID-19 viral test in the last 14 days? No    Do you have any of the following new or worsening symptoms? None of these    Have you traveled internationally in the last month? No      Travel History   Travel since 09/07/20     No documented travel since 09/07/20         Patient denies S/S covid/travel screen. Provided instruction for curbside INR check/appiontment. Erickson states understanding. Patient called back and spoke to him.

## 2020-10-08 ENCOUNTER — HOSPITAL ENCOUNTER (OUTPATIENT)
Dept: PHARMACY | Age: 70
Setting detail: THERAPIES SERIES
Discharge: HOME | End: 2020-10-08
Payer: MEDICARE

## 2020-10-08 VITALS — TEMPERATURE: 97.4 F

## 2020-10-08 LAB — INR BLD: 5

## 2020-10-08 PROCEDURE — 85610 PROTHROMBIN TIME: CPT

## 2020-10-08 PROCEDURE — 99212 OFFICE O/P EST SF 10 MIN: CPT

## 2020-10-08 RX ORDER — OXYCODONE HYDROCHLORIDE AND ACETAMINOPHEN 5; 325 MG/1; MG/1
1 TABLET ORAL EVERY 4 HOURS PRN
COMMUNITY
End: 2020-10-22 | Stop reason: ALTCHOICE

## 2020-10-08 NOTE — PROGRESS NOTES
[address] [facility]/[place]  Medication Management  ANTICOAGULATION    Referring Doctor: Tesfaye Guzmán INR: 2-3    TODAY'S INR: 5    WARFARIN Dosage: Patient will hold today and tomorrow due to INR of 5 today then patient will continue to hold next 3 days for catheter insertion. Then patient will take warfarin whole 5 tablet for 2 days after procedure then resume whole 5 mg tablet on Mondays and Fridays and half tablet for 2.5 mg al other days. Saw patient CURBSIDE. INR (no units)   Date Value   10/08/2020 5   09/17/2020 2.8   09/03/2020 2.5   08/13/2020 1.8   07/23/2020 3.5   07/07/2020 1.4   06/23/2020 1.7       Hemoglobin (g/dL)   Date Value   09/17/2020 11.8 (L)   06/23/2020 9.7 (L)   05/18/2020 11.3 (L)     Hematocrit (%)   Date Value   09/17/2020 34.7 (L)   06/23/2020 30.7 (L)   05/18/2020 34.8 (L)     Platelets (k/uL)   Date Value   09/17/2020 See Reflexed IPF Result   06/23/2020 296   05/18/2020 169       Significant Drug-Drug Interactions:  Percocet    Notes:    Fingerstick INR drawn per clinic protocol. Patient states no visible blood in urine and no black tarry stool. Denies any missed doses of warfarin. No change in other maintenance medications or in diet. Will recheck INR in 1 week post procedure. Patient acknowledges working in consult agreement with pharmacist as referred by his/her physician. Patient has a cyst on his leg that was painful and he took Percocet for a week which could increase his INR. Right eye is very blood shot and patients arms are bruised. Patient is having catheter insertion on 10/13/20 so he can do home dialysis. Patient will hold warfarin 3 days prior to procedure. Patient will NOT bridge with heparin. Per Jorge Crowley, patient does NOT need INR check at clinic prior to/morning of procedure.         CLINICAL PHARMACY CONSULT: MED RECONCILIATION/REVIEW ADDENDUM    For Pharmacy Admin Tracking Only    PHSO: Yes  Total # of Interventions Recommended: 6  - Decreased Dose #: 5  - New Order #: 1 New Medication Order Reason(s): Needs Additional Medication Therapy  - Maintenance Safety Lab Monitoring #: 1  Recommended intervention potential cost savings:   Accepted intervention potential cost savings:    Total Interventions Accepted: 7  Time Spent (min): Fabien Brar, PharmD

## 2020-10-21 ENCOUNTER — TELEPHONE (OUTPATIENT)
Dept: PHARMACY | Age: 70
End: 2020-10-21

## 2020-10-21 NOTE — TELEPHONE ENCOUNTER
Recent Travel Screening and Travel History documentation:     Travel Screening     Question   Response    In the last month, have you been in contact with someone who was confirmed or suspected to have Coronavirus / COVID-19? Unable to assess    Have you had a COVID-19 viral test in the last 14 days? Unable to assess    Do you have any of the following new or worsening symptoms? Unable to assess    Have you traveled internationally in the last month? Unable to assess      Travel History   Travel since 09/21/20     No documented travel since 09/21/20         Left message for patient and provided instruction for coumadin clinic [facility] INR check/ appointment. Instructed patient to notify clinic if fever or s/s respiratory illness.

## 2020-10-22 ENCOUNTER — HOSPITAL ENCOUNTER (OUTPATIENT)
Dept: PHARMACY | Age: 70
Setting detail: THERAPIES SERIES
Discharge: HOME | End: 2020-10-22
Payer: MEDICARE

## 2020-10-22 VITALS
HEART RATE: 91 BPM | TEMPERATURE: 97.9 F | DIASTOLIC BLOOD PRESSURE: 51 MMHG | SYSTOLIC BLOOD PRESSURE: 88 MMHG | BODY MASS INDEX: 19.11 KG/M2 | WEIGHT: 137 LBS

## 2020-10-22 LAB — INR BLD: 3.5

## 2020-10-22 PROCEDURE — 99212 OFFICE O/P EST SF 10 MIN: CPT

## 2020-10-22 PROCEDURE — 85610 PROTHROMBIN TIME: CPT

## 2020-10-22 NOTE — PATIENT INSTRUCTIONS
Continue to monitor urine and stool. Continue to monitor for signs of bleeding. Return to clinic in 1.5 weeks. Hold warfarin today then decrease to whole 5 mg tablet on Mondays and half tablet for 2.5 mg all other days.

## 2020-10-22 NOTE — PROGRESS NOTES
[address] [facility]/[place]  Medication Management  ANTICOAGULATION    Referring Doctor: Sally Griffin INR: 2-3    TODAY'S INR: 3.5    WARFARIN Dosage: Patient will hold warfarin today then decrease to whole 5 mg tablet on Mondays and half tablet for 2.5 mg all other days. INR (no units)   Date Value   10/22/2020 3.5   10/08/2020 5   09/17/2020 2.8   09/03/2020 2.5   08/13/2020 1.8   07/23/2020 3.5   07/07/2020 1.4       Hemoglobin (g/dL)   Date Value   09/17/2020 11.8 (L)   06/23/2020 9.7 (L)   05/18/2020 11.3 (L)     Hematocrit (%)   Date Value   09/17/2020 34.7 (L)   06/23/2020 30.7 (L)   05/18/2020 34.8 (L)     Platelets (k/uL)   Date Value   09/17/2020 See Reflexed IPF Result   06/23/2020 296   05/18/2020 169     Significant Drug-Drug Interactions:  Stopped Percocet    Notes:    Fingerstick INR drawn per clinic protocol. Patient states no visible blood in urine and no black tarry stool. Denies any missed doses of warfarin. No change in other maintenance medications or in diet. Will recheck INR in 1.5 weeks. Patient acknowledges working in consult agreement with pharmacist as referred by his/her physician. CLINICAL PHARMACY CONSULT: MED RECONCILIATION/REVIEW ADDENDUM    For Pharmacy Admin Tracking Only    PHSO: Yes  Total # of Interventions Recommended: 3  - Decreased Dose #: 2  - Discontinued Medication #: 1 Discontinue Reason(s): Acute Therapy Complete  - Maintenance Safety Lab Monitoring #: 1  Recommended intervention potential cost savings:   Accepted intervention potential cost savings:    Total Interventions Accepted: 4  Time Spent (min): 30    Xavier James, HaileyD

## 2020-11-02 ENCOUNTER — TELEPHONE (OUTPATIENT)
Dept: PHARMACY | Age: 70
End: 2020-11-02

## 2020-11-02 NOTE — TELEPHONE ENCOUNTER
Recent Travel Screening and Travel History documentation:     Travel Screening     Question   Response    In the last month, have you been in contact with someone who was confirmed or suspected to have Coronavirus / COVID-19? No / Unsure    Have you had a COVID-19 viral test in the last 14 days? No    Do you have any of the following new or worsening symptoms? None of these    Have you traveled internationally in the last month? No      Travel History   Travel since 10/02/20     No documented travel since 10/02/20         Patient denies S/S covid/travel screen. Provided instruction for curbside INR check/appiontment. Erickson states understanding. Told to come inside to clinic for apt.

## 2020-11-05 ENCOUNTER — HOSPITAL ENCOUNTER (OUTPATIENT)
Dept: PHARMACY | Age: 70
Setting detail: THERAPIES SERIES
Discharge: HOME | End: 2020-11-05
Payer: MEDICARE

## 2020-11-05 VITALS
WEIGHT: 138.89 LBS | SYSTOLIC BLOOD PRESSURE: 116 MMHG | TEMPERATURE: 97.5 F | DIASTOLIC BLOOD PRESSURE: 68 MMHG | BODY MASS INDEX: 19.37 KG/M2

## 2020-11-05 LAB — INR BLD: 3

## 2020-11-05 PROCEDURE — 85610 PROTHROMBIN TIME: CPT

## 2020-11-05 PROCEDURE — 99211 OFF/OP EST MAY X REQ PHY/QHP: CPT

## 2020-11-05 NOTE — PATIENT INSTRUCTIONS
Continue current dose of warfarin as instructed on dosing calendar provided - 1 whole tablet (=5 mg) every Monday and 1/2 tablet (=2.5 mg) all other days. Return to clinic in 3 weeks. Continue to monitor urine and stool for signs and symptoms of bleeding. Please notify the clinic of any medication changes.

## 2020-11-05 NOTE — PROGRESS NOTES
[address] [facility]/[place]  Medication Management  ANTICOAGULATION    Referring Doctor: Nick Cano INR: 2-3    TODAY'S INR: 3    WARFARIN Dosage: Patient will continue warfarin 5 mg every Monday and 2.5 mg all other days. INR (no units)   Date Value   11/05/2020 3   10/22/2020 3.5   10/08/2020 5   09/17/2020 2.8   09/03/2020 2.5   08/13/2020 1.8   07/23/2020 3.5         Notes:    Fingerstick INR drawn per clinic protocol. Patient states no visible blood in urine and no black tarry stool. Denies any missed doses of warfarin. No change in other maintenance medications or in diet. Will recheck INR in 3 weeks. Patient acknowledges working in consult agreement with pharmacist as referred by his/her physician. CLINICAL PHARMACY CONSULT: MED RECONCILIATION/REVIEW ADDENDUM    For Pharmacy Admin Tracking Only    PHSO: Yes  Total # of Interventions Recommended: 1  - Updated Order #: 1 Updated Order Reason(s): Other  - Maintenance Safety Lab Monitoring #: 1  Total Interventions Accepted: 2  Time Spent (min): [address]

## 2020-11-23 ENCOUNTER — TELEPHONE (OUTPATIENT)
Dept: PHARMACY | Age: 70
End: 2020-11-23

## 2020-11-23 NOTE — TELEPHONE ENCOUNTER
Recent Travel Screening and Travel History documentation:     Travel Screening     Question   Response    In the last month, have you been in contact with someone who was confirmed or suspected to have Coronavirus / COVID-19? No / Unsure    Have you had a COVID-19 viral test in the last 14 days? No    Do you have any of the following new or worsening symptoms? None of these    Have you traveled internationally in the last month? No      Travel History   Travel since 10/23/20     No documented travel since 10/23/20         Left message for patient and provided instruction for  INR check/appointment. Instructed patient to notify clinic if fever or s/s respiratory illness.

## 2020-11-24 ENCOUNTER — HOSPITAL ENCOUNTER (OUTPATIENT)
Dept: PHARMACY | Age: 70
Setting detail: THERAPIES SERIES
Discharge: HOME | End: 2020-11-24
Payer: MEDICARE

## 2020-11-24 VITALS
SYSTOLIC BLOOD PRESSURE: 97 MMHG | WEIGHT: 155.1 LBS | HEART RATE: 90 BPM | DIASTOLIC BLOOD PRESSURE: 63 MMHG | BODY MASS INDEX: 21.64 KG/M2

## 2020-11-24 LAB — INR BLD: 3.9

## 2020-11-24 PROCEDURE — 99212 OFFICE O/P EST SF 10 MIN: CPT | Performed by: FAMILY MEDICINE

## 2020-11-24 PROCEDURE — 85610 PROTHROMBIN TIME: CPT | Performed by: FAMILY MEDICINE

## 2020-11-24 NOTE — PROGRESS NOTES
[facility]  [facility]    Referring Doctor: Dr Kyara Ro INR: 2.0-3.0    TODAY'S INR: 3.9    WARFARIN Dosage: hold today, hold 11/26, 2.5mg all other days until return to coumadin clinic on 12/4/20    INR (no units)   Date Value   11/24/2020 3.9   11/05/2020 3   10/22/2020 3.5   10/08/2020 5   09/17/2020 2.8   09/03/2020 2.5   08/13/2020 1.8       Hemoglobin (g/dL)   Date Value   09/17/2020 11.8 (L)   06/23/2020 9.7 (L)   05/18/2020 11.3 (L)     Hematocrit (%)   Date Value   09/17/2020 34.7 (L)   06/23/2020 30.7 (L)   05/18/2020 34.8 (L)     Platelets (k/uL)   Date Value   09/17/2020 See Reflexed IPF Result   06/23/2020 296   05/18/2020 169       Significant Drug-Drug Interactions:  Clindamycin 300mg QID for 10 days (on day 2)    Notes:    Fingerstick INR drawn per clinic protocol. Patient states no visible blood in urine and no black tarry stool. Denies any missed doses of warfarin. No change in other maintenance medications or in diet. Will recheck INR in 1.5 weeks. Patient has had 17 lb weight increase since appt on 11/5/20. Patient states the leg with the abscess is swollen. There is significant swelling in calf on left leg (wound), there is minimal swelling in right leg. Patient states he is tired, but he skipped dialysis for 2 days and is sure that is why. This may also be contributing to his weight gain. Patient acknowledges working in consult agreement with pharmacist as referred by his/her physician.                   CLINICAL PHARMACY CONSULT: MED RECONCILIATION/REVIEW ADDENDUM    For Pharmacy Admin Tracking Only    PHSO: Yes  Total # of Interventions Recommended: 2  - Decreased Dose #: 3  - Maintenance Safety Lab Monitoring #: 1    Total Interventions Accepted: 3  Time Spent (min): 500 [facility]

## 2020-11-30 PROBLEM — L03.116 CELLULITIS OF LEFT LEG: Status: ACTIVE | Noted: 2020-11-30

## 2020-12-03 ENCOUNTER — TELEPHONE (OUTPATIENT)
Dept: PHARMACY | Age: 70
End: 2020-12-03

## 2020-12-03 NOTE — TELEPHONE ENCOUNTER
COVID-19 phone screening     Call placed to screen patient prior to upcoming Medication Management visit for Anticoagulation on 12/4/20. Does patient have any of the following symptoms? [] Fever    [] Lower respiratory symptoms (SOB, difficulty breathing, cough)  [] None  Left message for patient    Travel Screening completed. Patient instructed to wear mask during visit.     Damaris Hayden R.Ph., 12/3/2020,10:42 AM

## 2020-12-04 ENCOUNTER — HOSPITAL ENCOUNTER (OUTPATIENT)
Dept: PHARMACY | Age: 70
Setting detail: THERAPIES SERIES
Discharge: HOME | End: 2020-12-04
Payer: MEDICARE

## 2020-12-04 VITALS
HEART RATE: 93 BPM | WEIGHT: 147 LBS | SYSTOLIC BLOOD PRESSURE: 93 MMHG | BODY MASS INDEX: 21.09 KG/M2 | TEMPERATURE: 98.1 F | DIASTOLIC BLOOD PRESSURE: 52 MMHG

## 2020-12-04 LAB — INR BLD: 1.9

## 2020-12-04 PROCEDURE — 85610 PROTHROMBIN TIME: CPT

## 2020-12-04 PROCEDURE — 99211 OFF/OP EST MAY X REQ PHY/QHP: CPT

## 2020-12-04 NOTE — PATIENT INSTRUCTIONS
Continue to monitor urine and stool. Continue to monitor for signs of bleeding. Return to clinic in 2 weeks. Continue warfarin whole 5 mg tablet on Mondays and half tablet for 2.5 mg all other days.

## 2020-12-21 ENCOUNTER — HOSPITAL ENCOUNTER (OUTPATIENT)
Dept: PHARMACY | Age: 70
Setting detail: THERAPIES SERIES
End: 2020-12-21
Payer: MEDICARE

## 2020-12-29 ENCOUNTER — NURSE ONLY (OUTPATIENT)
Dept: CARDIOLOGY | Age: 70
End: 2020-12-29
Payer: MEDICARE

## 2020-12-29 PROCEDURE — 93295 DEV INTERROG REMOTE 1/2/MLT: CPT | Performed by: FAMILY MEDICINE

## 2020-12-30 ENCOUNTER — TELEPHONE (OUTPATIENT)
Dept: CARDIOLOGY | Age: 70
End: 2020-12-30

## 2021-01-05 ENCOUNTER — APPOINTMENT (OUTPATIENT)
Dept: GENERAL RADIOLOGY | Age: 71
End: 2021-01-05
Payer: MEDICARE

## 2021-01-05 ENCOUNTER — HOSPITAL ENCOUNTER (EMERGENCY)
Age: 71
Discharge: ANOTHER HOSPITAL | End: 2021-01-05
Attending: EMERGENCY MEDICINE
Payer: MEDICARE

## 2021-01-05 VITALS
HEIGHT: 72 IN | SYSTOLIC BLOOD PRESSURE: 99 MMHG | WEIGHT: 115 LBS | DIASTOLIC BLOOD PRESSURE: 41 MMHG | BODY MASS INDEX: 15.58 KG/M2 | HEART RATE: 93 BPM | RESPIRATION RATE: 16 BRPM | TEMPERATURE: 98.6 F | OXYGEN SATURATION: 92 %

## 2021-01-05 DIAGNOSIS — U07.1 COVID-19: Primary | ICD-10-CM

## 2021-01-05 LAB
ABSOLUTE EOS #: 0 K/UL (ref 0–0.44)
ABSOLUTE IMMATURE GRANULOCYTE: 0 K/UL (ref 0–0.3)
ABSOLUTE LYMPH #: 1.65 K/UL (ref 1.1–3.7)
ABSOLUTE MONO #: 0.3 K/UL (ref 0.1–1.2)
ALBUMIN SERPL-MCNC: 2.6 G/DL (ref 3.5–5.2)
ALBUMIN/GLOBULIN RATIO: 0.7 (ref 1–2.5)
ALLEN TEST: ABNORMAL
ALP BLD-CCNC: 64 U/L (ref 40–129)
ALT SERPL-CCNC: <5 U/L (ref 5–41)
ANION GAP SERPL CALCULATED.3IONS-SCNC: 17 MMOL/L (ref 9–17)
AST SERPL-CCNC: 52 U/L
BASOPHILS # BLD: 0 % (ref 0–2)
BASOPHILS ABSOLUTE: 0 K/UL (ref 0–0.2)
BILIRUB SERPL-MCNC: 0.57 MG/DL (ref 0.3–1.2)
BUN BLDV-MCNC: 87 MG/DL (ref 8–23)
BUN/CREAT BLD: 9 (ref 9–20)
C-REACTIVE PROTEIN: 221.5 MG/L (ref 0–5)
CALCIUM SERPL-MCNC: 7.9 MG/DL (ref 8.6–10.4)
CARBOXYHEMOGLOBIN: ABNORMAL % (ref 0–5)
CHLORIDE BLD-SCNC: 91 MMOL/L (ref 98–107)
CO2: 25 MMOL/L (ref 20–31)
CREAT SERPL-MCNC: 9.2 MG/DL (ref 0.7–1.2)
D-DIMER QUANTITATIVE: 2.08 MG/L FEU (ref 0–0.59)
DIFFERENTIAL TYPE: ABNORMAL
EKG ATRIAL RATE: 72 BPM
EKG Q-T INTERVAL: 330 MS
EKG QRS DURATION: 100 MS
EKG QTC CALCULATION (BAZETT): 438 MS
EKG R AXIS: -42 DEGREES
EKG T AXIS: 155 DEGREES
EKG VENTRICULAR RATE: 106 BPM
EOSINOPHILS RELATIVE PERCENT: 0 % (ref 1–4)
FERRITIN: 5857 UG/L (ref 30–400)
FIBRINOGEN: 626 MG/DL (ref 179–518)
FIO2: ABNORMAL
GFR AFRICAN AMERICAN: 7 ML/MIN
GFR NON-AFRICAN AMERICAN: 6 ML/MIN
GFR SERPL CREATININE-BSD FRML MDRD: ABNORMAL ML/MIN/{1.73_M2}
GFR SERPL CREATININE-BSD FRML MDRD: ABNORMAL ML/MIN/{1.73_M2}
GLUCOSE BLD-MCNC: 111 MG/DL (ref 70–99)
HCO3 VENOUS: 14.3 MMOL/L (ref 24–30)
HCT VFR BLD CALC: 34.1 % (ref 40.7–50.3)
HEMOGLOBIN: 11.3 G/DL (ref 13–17)
IMMATURE GRANULOCYTES: 0 %
INR BLD: 2.3
LACTATE DEHYDROGENASE: 458 U/L (ref 135–225)
LACTIC ACID, SEPSIS WHOLE BLOOD: ABNORMAL MMOL/L (ref 0.5–1.9)
LACTIC ACID, SEPSIS: 2 MMOL/L (ref 0.5–1.9)
LYMPHOCYTES # BLD: 22 % (ref 24–43)
MCH RBC QN AUTO: 33.1 PG (ref 25.2–33.5)
MCHC RBC AUTO-ENTMCNC: 33.1 G/DL (ref 28.4–34.8)
MCV RBC AUTO: 100 FL (ref 82.6–102.9)
METHEMOGLOBIN: ABNORMAL % (ref 0–1.9)
MODE: ABNORMAL
MONOCYTES # BLD: 4 % (ref 3–12)
MORPHOLOGY: NORMAL
NEGATIVE BASE EXCESS, VEN: 7.9 MMOL/L (ref 0–2)
NOTIFICATION TIME: ABNORMAL
NOTIFICATION: ABNORMAL
NRBC AUTOMATED: 0.3 PER 100 WBC
O2 DEVICE/FLOW/%: ABNORMAL
O2 SAT, VEN: 91 % (ref 60–85)
OXYHEMOGLOBIN: ABNORMAL % (ref 95–98)
PATIENT TEMP: 37
PCO2, VEN, TEMP ADJ: ABNORMAL MMHG (ref 39–55)
PCO2, VEN: 22.7 (ref 39–55)
PDW BLD-RTO: 12.8 % (ref 11.8–14.4)
PEEP/CPAP: ABNORMAL
PH VENOUS: 7.42 (ref 7.32–7.42)
PH, VEN, TEMP ADJ: ABNORMAL (ref 7.32–7.42)
PLATELET # BLD: 237 K/UL (ref 138–453)
PLATELET ESTIMATE: ABNORMAL
PMV BLD AUTO: 11.5 FL (ref 8.1–13.5)
PO2, VEN, TEMP ADJ: ABNORMAL MMHG (ref 30–50)
PO2, VEN: 57.3 (ref 30–50)
POSITIVE BASE EXCESS, VEN: ABNORMAL MMOL/L (ref 0–2)
POTASSIUM SERPL-SCNC: 3.6 MMOL/L (ref 3.7–5.3)
PROCALCITONIN: 3.38 NG/ML
PROTHROMBIN TIME: 25.1 SEC (ref 11.5–14.2)
PSV: ABNORMAL
PT. POSITION: ABNORMAL
RBC # BLD: 3.41 M/UL (ref 4.21–5.77)
RBC # BLD: ABNORMAL 10*6/UL
RESPIRATORY RATE: ABNORMAL
SAMPLE SITE: ABNORMAL
SARS-COV-2, RAPID: DETECTED
SARS-COV-2: ABNORMAL
SARS-COV-2: ABNORMAL
SEG NEUTROPHILS: 74 % (ref 36–65)
SEGMENTED NEUTROPHILS ABSOLUTE COUNT: 5.55 K/UL (ref 1.5–8.1)
SET RATE: ABNORMAL
SODIUM BLD-SCNC: 133 MMOL/L (ref 135–144)
SOURCE: ABNORMAL
TEXT FOR RESPIRATORY: ABNORMAL
TOTAL HB: ABNORMAL G/DL (ref 12–16)
TOTAL PROTEIN: 6.5 G/DL (ref 6.4–8.3)
TOTAL RATE: ABNORMAL
VT: ABNORMAL
WBC # BLD: 7.5 K/UL (ref 3.5–11.3)
WBC # BLD: ABNORMAL 10*3/UL

## 2021-01-05 PROCEDURE — 93010 ELECTROCARDIOGRAM REPORT: CPT | Performed by: INTERNAL MEDICINE

## 2021-01-05 PROCEDURE — 85379 FIBRIN DEGRADATION QUANT: CPT

## 2021-01-05 PROCEDURE — 83615 LACTATE (LD) (LDH) ENZYME: CPT

## 2021-01-05 PROCEDURE — 36415 COLL VENOUS BLD VENIPUNCTURE: CPT

## 2021-01-05 PROCEDURE — 96374 THER/PROPH/DIAG INJ IV PUSH: CPT

## 2021-01-05 PROCEDURE — 85610 PROTHROMBIN TIME: CPT

## 2021-01-05 PROCEDURE — U0002 COVID-19 LAB TEST NON-CDC: HCPCS

## 2021-01-05 PROCEDURE — 93005 ELECTROCARDIOGRAM TRACING: CPT | Performed by: EMERGENCY MEDICINE

## 2021-01-05 PROCEDURE — 86140 C-REACTIVE PROTEIN: CPT

## 2021-01-05 PROCEDURE — 6370000000 HC RX 637 (ALT 250 FOR IP): Performed by: EMERGENCY MEDICINE

## 2021-01-05 PROCEDURE — 2580000003 HC RX 258: Performed by: EMERGENCY MEDICINE

## 2021-01-05 PROCEDURE — 84145 PROCALCITONIN (PCT): CPT

## 2021-01-05 PROCEDURE — 73070 X-RAY EXAM OF ELBOW: CPT

## 2021-01-05 PROCEDURE — 99285 EMERGENCY DEPT VISIT HI MDM: CPT

## 2021-01-05 PROCEDURE — 87040 BLOOD CULTURE FOR BACTERIA: CPT

## 2021-01-05 PROCEDURE — 80053 COMPREHEN METABOLIC PANEL: CPT

## 2021-01-05 PROCEDURE — 82805 BLOOD GASES W/O2 SATURATION: CPT

## 2021-01-05 PROCEDURE — 82728 ASSAY OF FERRITIN: CPT

## 2021-01-05 PROCEDURE — 85025 COMPLETE CBC W/AUTO DIFF WBC: CPT

## 2021-01-05 PROCEDURE — 85384 FIBRINOGEN ACTIVITY: CPT

## 2021-01-05 PROCEDURE — 71045 X-RAY EXAM CHEST 1 VIEW: CPT

## 2021-01-05 PROCEDURE — 83605 ASSAY OF LACTIC ACID: CPT

## 2021-01-05 PROCEDURE — 2500000003 HC RX 250 WO HCPCS: Performed by: EMERGENCY MEDICINE

## 2021-01-05 RX ORDER — ACETAMINOPHEN 650 MG/1
650 SUPPOSITORY RECTAL EVERY 6 HOURS PRN
Status: DISCONTINUED | OUTPATIENT
Start: 2021-01-05 | End: 2021-01-05 | Stop reason: HOSPADM

## 2021-01-05 RX ORDER — 0.9 % SODIUM CHLORIDE 0.9 %
1000 INTRAVENOUS SOLUTION INTRAVENOUS ONCE
Status: COMPLETED | OUTPATIENT
Start: 2021-01-05 | End: 2021-01-05

## 2021-01-05 RX ORDER — ACETAMINOPHEN 325 MG/1
650 TABLET ORAL EVERY 6 HOURS PRN
Status: DISCONTINUED | OUTPATIENT
Start: 2021-01-05 | End: 2021-01-05 | Stop reason: HOSPADM

## 2021-01-05 RX ADMIN — SODIUM CHLORIDE 1000 ML: 9 INJECTION, SOLUTION INTRAVENOUS at 08:44

## 2021-01-05 RX ADMIN — NOREPINEPHRINE BITARTRATE 2 MCG/MIN: 1 INJECTION, SOLUTION, CONCENTRATE INTRAVENOUS at 12:36

## 2021-01-05 RX ADMIN — ACETAMINOPHEN 650 MG: 650 SUPPOSITORY RECTAL at 08:40

## 2021-01-05 RX ADMIN — ACETAMINOPHEN 650 MG: 325 TABLET, FILM COATED ORAL at 08:39

## 2021-01-05 RX ADMIN — SODIUM CHLORIDE 1000 ML: 9 INJECTION, SOLUTION INTRAVENOUS at 11:38

## 2021-01-05 ASSESSMENT — ENCOUNTER SYMPTOMS
RHINORRHEA: 0
SHORTNESS OF BREATH: 1
CONSTIPATION: 0
DIARRHEA: 0
SORE THROAT: 0
NAUSEA: 0
VOMITING: 0
ABDOMINAL DISTENTION: 0
WHEEZING: 0
COUGH: 1

## 2021-01-05 NOTE — ED NOTES
Contacted radiology for disk or images to be transmitted to [facility].      Bertrum Corinne A Reser  01/05/21 1038

## 2021-01-05 NOTE — ED NOTES
Per Doyline, ok for pt to be admitted to room previously given.      Jackson MAURICIO Reser  01/05/21 1225

## 2021-01-05 NOTE — ED PROVIDER NOTES
[address] [place] [address] ED  Emergency Department        Pt Name: Oksana Heart  MRN: [id number]  [place] 1950  Date of evaluation: 1/5/21    CHIEF COMPLAINT       Chief Complaint   Patient presents with    Fall     tripped over home dialysis cord; denies loss of consciousness    Abrasion     left shoulder, left elbow & left knee    Cough     room air pulse oximetry 76-83%       HISTORY OF PRESENT ILLNESS  (Location/Symptom, Timing/Onset, Context/Setting, Quality, Duration, ModifyingFactors, Severity.)      Oksana Heart is a 65 y.o. male who presents with mechanical fall today tripping over his peritoneal dialysis tubing. Complains of left elbow pain however he was noted upon arrival to be hypoxic he reports he has had a cough over the past few months. He states that his machine has not been functioning correctly so he has not been doing the full 6 hours of dialysis treatment. States that he has been not doing his full treatment for about the past month. He also reports he has been short of breath and fatigued, with decreased appetite. Patient also accompanied by a neighbor who states that she has been trying to get him to come to the hospital because he has been so short of breath. He is not on any oxygen at baseline. PAST MEDICAL / SURGICAL / SOCIAL / FAMILY HISTORY      has a past medical history of CAD (coronary artery disease), Cancer ([address] Chronic combined systolic and diastolic CHF, NYHA class 3 ([address] CRF (chronic renal failure) / stage 4, Dysautonomia orthostatic hypotension syndrome ([address] H/O echocardiogram, Hemodialysis patient ([address] History of blood transfusion, History of echocardiogram, History of tilt table evaluation, ICD (implantable cardiac defibrillator), single, in situ, Mixed hyperlipidemia, Non-ischemic cardiomyopathy ([address] Restless leg syndrome, and Systolic CHF, acute on chronic ([address] has a past surgical history that includes Cardiac catheterization (6/6/2013); Colonoscopy (2013); skin biopsy (9-); joint replacement (Right, HIP); Skin cancer excision (Left, 06/05/2015); Tympanoplasty (Left, 05/12/2016); other surgical history (11/2016); Dialysis fistula creation (01/18/2017); pacemaker placement (2013); pre-malignant / benign skin lesion excision (Left, 06/19/2017); EXCISION / BIOPSY SKIN LESION OF HEAD / NECK (Left, 6/19/2017); Dialysis fistula creation (Right, 11/22/2017); and Dialysis fistula creation (Right, 11/22/2017). Social History     Socioeconomic History    Marital status: Single     Spouse name: Not on file    Number of children: Not on file    Years of education: Not on file    Highest education level: Not on file   Occupational History    Occupation: retired   Social Needs    Financial resource strain: Not hard at all   [employer] insecurity     Worry: Never true     Inability: Never true   [employer] needs     Medical: No     Non-medical: No   Tobacco Use    Smoking status: Never Smoker    Smokeless tobacco: Never Used   Substance and Sexual Activity    Alcohol use:  Yes     Alcohol/week: 0.0 standard drinks     Types: 3 - 4 Cans of beer per week     Comment: daily drinker BEER 3-4    Drug use: Not Currently     Comment: Remote years ago    Sexual activity: Not on file   Lifestyle    Physical activity     Days per week: 2 days     Minutes per session: 10 min    Stress: Not at all   Relationships    Social connections     Talks on phone: Not on file     Gets together: Not on file     Attends Yazidi service: Not on file     Active member of club or organization: Not on file     Attends meetings of clubs or organizations: Not on file     Relationship status: Not on file    Intimate partner violence     Fear of current or ex partner: Not on file     Emotionally abused: Not on file     Physically abused: Not on file Forced sexual activity: Not on file   Other Topics Concern    Not on file   Social History Narrative    Not on file       Family History   Problem Relation Age of Onset    Heart Disease Father         heart attack in 1988       Allergies: Ancef [cefazolin] and Marcaine [bupivacaine hcl]    Home Medications:  Prior to Admission medications    Medication Sig Start Date End Date Taking? Authorizing Provider   lisinopril (PRINIVIL;ZESTRIL) 2.5 MG tablet Take 0.5 tablets by mouth daily 12/21/20   Ramon Delacruz MD   rOPINIRole (REQUIP) 1 MG tablet Take 1 tablet by mouth nightly 11/4/20   Ramon Delacruz MD   carbidopa-levodopa (SINEMET)  MG per tablet Take 2 tablets by mouth 2 times daily 10/12/20   Ramon Delacruz MD   gabapentin (NEURONTIN) 100 MG capsule Take 2 capsules by mouth 2 times daily as needed (numbness) for up to 30 days. 9/25/20 10/25/20  Ramon Delacruz MD   warfarin (COUMADIN) 5 MG tablet TAKE 1 TABLET BY MOUTH EVERY DAY  Patient taking differently: Take by mouth See Admin Instructions Whole 5 mg tablet on M,F and half tablet for 2.5 mg all other days. 8/31/20   Danielle Canada MD   metoprolol succinate (TOPROL XL) 25 MG extended release tablet Take 1 tablet by mouth daily 6/22/20   Danielle Canada MD   Calcium Acetate, Phos Binder, 667 MG CAPS Take 667 mg by mouth 3 times daily (with meals) 2/8/20   Historical Provider, MD   midodrine (PROAMATINE) 5 MG tablet Take 1 tablet by mouth 2 times daily (with meals) Per  increase Midodrine to 20 mg on morning of Dialysis.  4/27/20   Danielle Canada MD   digoxin (LANOXIN) 125 MCG tablet TAKE 1 TABLET BY MOUTH EVERY DAY 12/17/19   Danielle Canada MD   fludrocortisone (FLORINEF) 0.1 MG tablet Take 0.1 mg by mouth daily    Historical Provider, MD   furosemide (LASIX) 80 MG tablet Take 80 mg by mouth 2 times daily    Historical Provider, MD Misc. Devices [facility]) MISC 1 each by Does not apply route daily Needs to be stand up walker 12/12/18   Willi Hale MD   Handicap Placard MISC by Does not apply route Duration; 4 years 9/12/18   Jesus Barrow MD   sodium bicarbonate 650 MG tablet Take 325 mg by mouth 2 times daily  4/8/17   Historical Provider, MD   allopurinol (ZYLOPRIM) 100 MG tablet Take 1 tablet by mouth daily 6/29/16   Loyd Mathew MD       REVIEW OF SYSTEMS    (2-9 systems for level 4, 10 or more for level 5)      Review of Systems   Constitutional: Positive for activity change, appetite change and fatigue. Negative for fever. HENT: Negative for congestion, rhinorrhea and sore throat. Respiratory: Positive for cough and shortness of breath. Negative for wheezing. Cardiovascular: Negative for chest pain, palpitations and leg swelling. Gastrointestinal: Negative for abdominal distention, constipation, diarrhea, nausea and vomiting. Genitourinary: Negative for decreased urine volume and dysuria. Skin: Negative for rash. Neurological: Negative for dizziness, weakness, light-headedness, numbness and headaches. PHYSICAL EXAM   (up to 7 for level 4, 8 or more for level 5)     INITIAL VITALS:   BP (!) 108/52   Pulse 115   Temp 98.6 °F (37 °C) (Oral)   Resp 27   Ht 6' (1.829 m)   Wt 115 lb (52.2 kg)   SpO2 92%   BMI 15.60 kg/m²     Physical Exam  Constitutional:       Appearance: Normal appearance. Comments: Patient is awake alert speaking in full sentences, mentating appropriately, tachycardic and skin is very hot to touch   HENT:      Nose: Nose normal. No congestion or rhinorrhea. Neck:      Musculoskeletal: Normal range of motion and neck supple. Cardiovascular:      Rate and Rhythm: Tachycardia present. Pulmonary:      Effort: Pulmonary effort is normal.   Abdominal:      General: Abdomen is flat. Bowel sounds are normal. There is no distension. Palpations: Abdomen is soft. Tenderness: There is no abdominal tenderness. Comments: Patient with peritoneal dialysis catheter noted, abdomen is flat, and soft without tenderness to palpation. Musculoskeletal:      Right lower leg: No edema. Left lower leg: No edema. Skin:     Comments: Patient with skin tear noted to the lateral epicondyle of the left arm. With tenderness to palpation he has 2+ radial pulse, with 5 out of 5 hand grasp strength. He does have some mild pain with flexion and extension at the elbow. But no obvious deformity. Neurological:      Mental Status: He is alert. DIFFERENTIAL  DIAGNOSIS     Patient is awake alert, tachycardic as well as febrile. He has had a cough for the past few months but is now weak, and running a fever. Neighbor tried to get him to come to the hospital sooner and patient did not, until he fell today which it was a mechanical fall. Concern for sepsis. We will plan on antipyretics, cultures check chest x-ray he was hypoxic upon arrival and there is concern for Covid. His abdomen is soft and low concern for SBP. Given his respiratory symptoms there is concern for pulmonary infectious etiology and anticipate admission. Broad-spectrum coverage.     PLAN (LABS / IMAGING / EKG):  Orders Placed This Encounter   Procedures    Culture, Blood 1    Culture, Blood 2    XR CHEST PORTABLE    XR ELBOW LEFT (2 VIEWS)    Blood gas, venous    CBC auto differential    Comprehensive Metabolic Panel w/ Reflex to MG    Urinalysis    Lactate, Sepsis    COVID-19    Protime-INR    Fibrinogen    Procalcitonin    C-Reactive Protein    Lactate Dehydrogenase    Ferritin    D-Dimer, Quantitative    D-Dimer, Quantitative    Fibrinogen    PPE Instructions    Droplet Plus Isolation    Initiate Oxygen Therapy Protocol    EKG 12 Lead    Insert peripheral IV    Insert peripheral IV       MEDICATIONS ORDERED:  Orders Placed This Encounter   Medications  0.9 % sodium chloride bolus    OR Linked Order Group     acetaminophen (TYLENOL) tablet 650 mg     acetaminophen (TYLENOL) suppository 650 mg       DIAGNOSTIC RESULTS / EMERGENCY DEPARTMENT COURSE / MDM     LABS:  Results for orders placed or performed during the hospital encounter of 01/05/21   CBC auto differential   Result Value Ref Range    WBC 7.5 3.5 - 11.3 k/uL    RBC 3.41 (L) 4.21 - 5.77 m/uL    Hemoglobin 11.3 (L) 13.0 - 17.0 g/dL    Hematocrit 34.1 (L) 40.7 - 50.3 %    .0 82.6 - 102.9 fL    MCH 33.1 25.2 - 33.5 pg    MCHC 33.1 28.4 - 34.8 g/dL    RDW 12.8 11.8 - 14.4 %    Platelets 920 054 - 156 k/uL    MPV 11.5 8.1 - 13.5 fL    NRBC Automated 0.3 (H) 0.0 per 100 WBC    Differential Type NOT REPORTED     WBC Morphology NOT REPORTED     RBC Morphology NOT REPORTED     Platelet Estimate NOT REPORTED     Seg Neutrophils 74 (H) 36 - 65 %    Lymphocytes 22 (L) 24 - 43 %    Monocytes 4 3 - 12 %    Eosinophils % 0 (L) 1 - 4 %    Immature Granulocytes 0 0 %    Basophils 0 0 - 2 %    Segs Absolute 5.55 1.50 - 8.10 k/uL    Absolute Lymph # 1.65 1.10 - 3.70 k/uL    Absolute Mono # 0.30 0.10 - 1.20 k/uL    Absolute Eos # 0.00 0.00 - 0.44 k/uL    Absolute Immature Granulocyte 0.00 0.00 - 0.30 k/uL    Basophils Absolute 0.00 0.0 - 0.2 k/uL    Morphology Normal    Comprehensive Metabolic Panel w/ Reflex to MG   Result Value Ref Range    Glucose 111 (H) 70 - 99 mg/dL    BUN 87 (H) 8 - 23 mg/dL    CREATININE 9.20 (HH) 0.70 - 1.20 mg/dL    Bun/Cre Ratio 9 9 - 20    Calcium 7.9 (L) 8.6 - 10.4 mg/dL    Sodium 133 (L) 135 - 144 mmol/L    Potassium 3.6 (L) 3.7 - 5.3 mmol/L    Chloride 91 (L) 98 - 107 mmol/L    CO2 25 20 - 31 mmol/L    Anion Gap 17 9 - 17 mmol/L    Alkaline Phosphatase 64 40 - 129 U/L    ALT <5 (L) 5 - 41 U/L    AST 52 (H) <40 U/L    Total Bilirubin 0.57 0.3 - 1.2 mg/dL    Total Protein 6.5 6.4 - 8.3 g/dL    Alb 2.6 (L) 3.5 - 5.2 g/dL    Albumin/Globulin Ratio 0.7 (L) 1.0 - 2.5 GFR Non-African American 6 (L) >60 mL/min    GFR  7 (L) >60 mL/min    GFR Comment          GFR Staging         Lactate, Sepsis   Result Value Ref Range    Lactic Acid, Sepsis 2.0 (H) 0.5 - 1.9 mmol/L    Lactic Acid, Sepsis, Whole Blood NOT REPORTED 0.5 - 1.9 mmol/L   COVID-19    Specimen: Other   Result Value Ref Range    SARS-CoV-2          SARS-CoV-2, Rapid DETECTED (A) Not Detected    Source . NASOPHARYNGEAL SWAB     SARS-CoV-2         Protime-INR   Result Value Ref Range    Protime 25.1 (H) 11.5 - 14.2 sec    INR 2.3    Lactate Dehydrogenase   Result Value Ref Range     (H) 135 - 225 U/L   D-Dimer, Quantitative   Result Value Ref Range    D-Dimer, Quant 2.08 (H) 0.00 - 0.59 mg/L FEU   Fibrinogen   Result Value Ref Range    Fibrinogen 626 (H) 179 - 518 mg/dL       IMPRESSION: Covid, hypoxia    RADIOLOGY:  XR ELBOW LEFT (2 VIEWS)   Preliminary Result   No acute osseous abnormality. XR CHEST PORTABLE   Final Result   Interval development diffuse bilateral airspace opacities; consider   viral/COVID pneumonia as well as multifocal MDRO related pneumonia (or   combination), as well as some degree vascular congestion with bilateral   pleural effusions. Mild basilar atelectasis. EKG:  EKG shows ventricular paced rhythm, with frequent PVCs no ST elevations or depressions noted. EMERGENCY DEPARTMENT COURSE:  8:11 AM EST  Patient covid positive, and requiring abx. Plan for transfer to [facility]. Patient is peritoneal dialysis.    He was updated on results,and I discussed transfer with him and he is agreeable with transfer to [facility]    9:41 AM BRENDA  Spoke with Yue almazan at [facility], and accepted under Dr. Karri Goodrich      12:09 PM BRENDA Prior to transport patient noted to have blood pressure decreasing into the 80s. He still awake and mentating appropriately. EMS was concerned for his heart rate being in the 30s to 40s however after examination his heart rate is in the 90s. And repeat EKG shows this. And continues to show a ventricular paced rhythm with frequent PVCs without ST changes. EMS monitors are not picking up the low-voltage QRSs and not reading heartbeats. But patient does have some hypotension, and he has been started on IV fluids and will be started on Levophed through a peripheral line. We will call and update [facility]. Patient continues to [facility] appropriately and does not feel worse than when he came in    Critical care time:   35 mintues    FINAL IMPRESSION      1. COVID-19          DISPOSITION / PLAN     DISPOSITION Decision To Transfer 01/05/2021 08:16:06 AM      PATIENT REFERRED TO:  No follow-up provider specified.     DISCHARGE MEDICATIONS:  New Prescriptions    No medications on file       Donnell Louie  9:41 AM    Attending Emergency Physician  [address] [place] [address] ED    (Please note that portions of this note were completed with a voice recognition program.  Effortswere made to edit the dictations but occasionally words are mis-transcribed.)              Smiley Landa, DO  01/05/21 [address], DO  01/05/21 Darrius Lujan [address], DO  01/05/21 1211

## 2021-01-05 NOTE — ED NOTES
[employer]. They are attempting to contact [facility] [place].      Timmie Duane A Reser  01/05/21 8699

## 2021-01-05 NOTE — ED NOTES
Attempted to contact [facility] to relay update on pt being transferred. On hold for several minutes with no answer. Writer then attempted to call [facility] supervisor to update and had to leave voicemail.      Norma MAURICIO Reser  01/05/21 2727

## 2021-01-05 NOTE — ED NOTES
Faxed positive Covid results to [facility].      Ramirez MAURICIO Reser  01/05/21 1224

## 2021-01-05 NOTE — ED NOTES
[address] for hospitalist at [facility] per Dr. Franklin Cast request.     Lark Prader A Reser  01/05/21 2517

## 2021-01-06 LAB
EKG ATRIAL RATE: 68 BPM
EKG Q-T INTERVAL: 402 MS
EKG QRS DURATION: 164 MS
EKG QTC CALCULATION (BAZETT): 502 MS
EKG R AXIS: 140 DEGREES
EKG T AXIS: 49 DEGREES
EKG VENTRICULAR RATE: 94 BPM

## 2021-01-10 LAB
CULTURE: NORMAL
CULTURE: NORMAL
Lab: NORMAL
Lab: NORMAL
SPECIMEN DESCRIPTION: NORMAL
SPECIMEN DESCRIPTION: NORMAL

## 2021-08-04 ENCOUNTER — ANTI-COAG VISIT (OUTPATIENT)
Dept: PHARMACY | Age: 71
End: 2021-08-04

## 2021-08-04 DIAGNOSIS — I48.0 PAROXYSMAL A-FIB (HCC): ICD-10-CM

## 2021-08-04 DIAGNOSIS — Z79.01 LONG TERM CURRENT USE OF ANTICOAGULANT: Primary | ICD-10-CM

## (undated) DEVICE — SOLUTION IV IRRIG POUR BRL 0.9% SODIUM CHL 2F7124

## (undated) DEVICE — GLOVE SURG SZ 8 L11.77IN FNGR THK9.8MIL STRW LTX POLYMER

## (undated) DEVICE — INTENDED FOR TISSUE SEPARATION, AND OTHER PROCEDURES THAT REQUIRE A SHARP SURGICAL BLADE TO PUNCTURE OR CUT.: Brand: BARD-PARKER ® CARBON RIB-BACK BLADES

## (undated) DEVICE — NDLCTR: FOAM/MAG 40CT 64/CS: Brand: MEDICAL ACTION INDUSTRIES

## (undated) DEVICE — DBD-PACK,BASIC,VI,AURORA: Brand: MEDLINE

## (undated) DEVICE — HAND AND FT PK

## (undated) DEVICE — SCANLAN® SUTURE BOOT™ INSTRUMENT JAW COVERS - ORIGINAL YELLOW, STANDARD PKG (5 PAIR/CARTRIDGE, 1 CARTRIDGE/PKG): Brand: SCANLAN® SUTURE BOOT™ INSTRUMENT JAW COVERS

## (undated) DEVICE — PEN: MARKING STD 100/CS: Brand: MEDICAL ACTION INDUSTRIES

## (undated) DEVICE — SUTURE MCRYL SZ 4-0 L18IN ABSRB UD L16MM PC-3 3/8 CIR PRIM Y845G

## (undated) DEVICE — HYPODERMIC SAFETY NEEDLE: Brand: MAGELLAN

## (undated) DEVICE — CANNULA PERF L2IN BLNT TIP 2MM VES CLR RADPQ BODY FEM LUER

## (undated) DEVICE — SUTURE VCRL SZ 3-0 L27IN ABSRB UD L26MM SH 1/2 CIR J416H

## (undated) DEVICE — 3M™ TEGADERM™ +PAD FILM DRESSING WITH NON-ADHERENT PAD, 3586, 3-1/2 IN X 4 IN (9 CM X 10 CM), 25/CAR, 4 CAR/CS: Brand: 3M™ TEGADERM™

## (undated) DEVICE — SOLUTION IV 500ML 0.9% SOD CHL PH 5 INJ USP VIAFLX PLAS

## (undated) DEVICE — CANNULA IV 18GA L15IN BLNT FILL LUERLOCK HUB MJCT

## (undated) DEVICE — GLOVE SURG SZ 8 L12IN FNGR THK87MIL WHT LTX FREE

## (undated) DEVICE — MEDI-VAC NON-CONDUCTIVE SUCTION TUBING 7MM X 3.7M (12 FT.) L: Brand: CARDINAL HEALTH

## (undated) DEVICE — SUTURE NONABSORBABLE MONOFILAMENT 6-0 C-1 1X30 IN PROLENE 8706H

## (undated) DEVICE — THREE-QUARTER SHEET: Brand: CONVERTORS

## (undated) DEVICE — GAUZE,SPONGE,4"X4",16PLY,XRAY,STRL,LF: Brand: MEDLINE

## (undated) DEVICE — GOWN,AURORA,NONRNF,XL,30/CS: Brand: MEDLINE

## (undated) DEVICE — DECANTER FLD 9IN ST BG FOR ASEP TRNSF OF FLD

## (undated) DEVICE — SUTURE PERMA-HAND SZ 2-0 L144IN NONABSORBABLE BLK LIGAPAK LA55G

## (undated) DEVICE — GEL US 5L BLU TRNSMIT CUBITAINER ACOUSTICALLY CORRECT

## (undated) DEVICE — APPLIER CLP L9.375IN APER 2.1MM CLS L3.8MM 20 SM TI CLP

## (undated) DEVICE — STRIP,CLOSURE,WOUND,MEDI-STRIP,1/2X4: Brand: MEDLINE

## (undated) DEVICE — ELECTRODE ES AD CRD L15FT DISP FOR PT BELOW 30LB REM

## (undated) DEVICE — LOOP,VESSEL,MINI,BLUE,2/PK,STERILE: Brand: MEDLINE

## (undated) DEVICE — MASTISOL ADHESIVE LIQ 2/3ML

## (undated) DEVICE — COVER LT HNDL BLU PLAS

## (undated) DEVICE — ELECTRODE ELECSURG NDL 2.8 INX7.2 CM COAT INSUL EDGE

## (undated) DEVICE — DISCONTINUED USE 394504 SYRINGE LUER LOCK TIP 12 ML

## (undated) DEVICE — SUTURE MCRYL SZ 4-0 L18IN ABSRB UD P-3 L13MM 3/8 CIR PRIM Y494G

## (undated) DEVICE — SUTURE PERMAHAND SZ 3-0 L30IN NONABSORBABLE BLK SH L26MM K832H

## (undated) DEVICE — SYRINGE, LUER LOCK, 10ML: Brand: MEDLINE

## (undated) DEVICE — SUTURE PERMA-HAND SZ 2-0 L30IN NONABSORBABLE BLK L26MM SH K833H

## (undated) DEVICE — SPONGE GZ W4XL4IN COT 12 PLY TYP VII WVN C FLD DSGN

## (undated) DEVICE — AGENT HEMSTAT W2XL4IN OXIDIZED REGENERATED CELOS ABSRB SFT

## (undated) DEVICE — SKIN AFFIX SURG ADHESIVE 72/CS 0.55ML: Brand: MEDLINE

## (undated) DEVICE — PENCIL ES L3M BTTN SWCH HOLSTER W/ BLDE ELECTRD EDGE

## (undated) DEVICE — NEEDLE HYPO 27GA L1.25IN GRY POLYPR HUB S STL REG BVL STR

## (undated) DEVICE — SYRINGE MED 20ML STD CLR PLAS LUERLOCK TIP N CTRL DISP